# Patient Record
Sex: MALE | Race: ASIAN | NOT HISPANIC OR LATINO | URBAN - METROPOLITAN AREA
[De-identification: names, ages, dates, MRNs, and addresses within clinical notes are randomized per-mention and may not be internally consistent; named-entity substitution may affect disease eponyms.]

---

## 2020-03-13 ENCOUNTER — EMERGENCY (EMERGENCY)
Facility: HOSPITAL | Age: 55
LOS: 1 days | Discharge: HOME | End: 2020-03-13
Attending: EMERGENCY MEDICINE
Payer: COMMERCIAL

## 2020-03-13 VITALS
DIASTOLIC BLOOD PRESSURE: 103 MMHG | HEIGHT: 67 IN | RESPIRATION RATE: 18 BRPM | OXYGEN SATURATION: 99 % | SYSTOLIC BLOOD PRESSURE: 160 MMHG | HEART RATE: 92 BPM | TEMPERATURE: 98 F | WEIGHT: 149.91 LBS

## 2020-03-13 PROCEDURE — 99284 EMERGENCY DEPT VISIT MOD MDM: CPT

## 2020-03-14 VITALS
TEMPERATURE: 98 F | SYSTOLIC BLOOD PRESSURE: 141 MMHG | DIASTOLIC BLOOD PRESSURE: 80 MMHG | OXYGEN SATURATION: 100 % | HEART RATE: 60 BPM | RESPIRATION RATE: 18 BRPM

## 2020-03-14 LAB
APPEARANCE UR: ABNORMAL
BILIRUB UR-MCNC: NEGATIVE — SIGNIFICANT CHANGE UP
COLOR SPEC: SIGNIFICANT CHANGE UP
DIFF PNL FLD: ABNORMAL
GLUCOSE UR QL: NEGATIVE — SIGNIFICANT CHANGE UP
KETONES UR-MCNC: NEGATIVE — SIGNIFICANT CHANGE UP
LEUKOCYTE ESTERASE UR-ACNC: NEGATIVE — SIGNIFICANT CHANGE UP
NITRITE UR-MCNC: NEGATIVE — SIGNIFICANT CHANGE UP
PH UR: 6.5 — SIGNIFICANT CHANGE UP (ref 5–8)
PROT UR-MCNC: ABNORMAL
SP GR SPEC: 1.01 — SIGNIFICANT CHANGE UP (ref 1.01–1.02)
UROBILINOGEN FLD QL: NEGATIVE — SIGNIFICANT CHANGE UP

## 2020-03-14 PROCEDURE — 81001 URINALYSIS AUTO W/SCOPE: CPT

## 2020-03-14 PROCEDURE — 99283 EMERGENCY DEPT VISIT LOW MDM: CPT

## 2020-03-14 PROCEDURE — 87086 URINE CULTURE/COLONY COUNT: CPT

## 2020-03-14 NOTE — ED PROVIDER NOTE - OBJECTIVE STATEMENT
53yo M here w/ cc of hematuria    States prostate biopsy on 3/3 by Dr. Comer urologist, now with reddish urine. No abd pain, no back pain, no f/c. nO n/v/d.

## 2020-03-14 NOTE — ED PROVIDER NOTE - NS ED ROS FT
CONSTITUTIONAL: No fevers, no chills  Cardiovascular: No Chest pain  Respiratory: No SOB  Gastrointestinal: No n/v/d, no abd pain  Genitourinary: refer to HPI  PSYCHIATRIC: no known mental health issues.

## 2020-03-14 NOTE — ED PROVIDER NOTE - PATIENT PORTAL LINK FT
You can access the FollowMyHealth Patient Portal offered by [facility] by registering at the following website: [email or web address]. By joining [email or web address]’s FollowMyHealth portal, you will also be able to view your health information using other applications (apps) compatible with our system.

## 2020-03-14 NOTE — ED PROVIDER NOTE - NSFOLLOWUPINSTRUCTIONS_ED_ALL_ED_FT
(1) Follow up with your Urologist as discussed  (2) Immediately seek care at your nearest emergency room if your worsen, persist, or do not resolve   (3) Take Tylenol (up to 1000mg or 1 g)  and/or Motrin (up to 600mg) up to every 6 hours as needed for pain.

## 2020-03-14 NOTE — ED PROVIDER NOTE - CLINICAL SUMMARY MEDICAL DECISION MAKING FREE TEXT BOX
[place] PGY-2:  55yo M here with hematuria s/p prostate biopsy done for reportedly high PH of urine, mild amount of blood in urine grossly likely normal after this procedure, no signs or sx ob obstruction, will attempt to reach urologist, ua, anticipate D/C

## 2020-03-14 NOTE — ED ADULT NURSE NOTE - OBJECTIVE STATEMENT
Patient 54 year old male, A/O x4. Patient came in due to hematuria/ Patient stated it began at 4pm. Patient stated blood was dark. Patient stated on March 3rd he received a prostate biopsy at [facility] because that was the first time he noticed blood in urine. Patient stated the doctor from the [facility] stated he may have an infection. Patient waited to come to ED today because urine is still red. Patient denies dysuria. Patient also complaining of headache and right neck pain. Denies PMH and PSH. Medications- amlodipine and tamsulosin. Abd. soft, nontender, nondistended. Lungs clear bilaterally. Skin intact, dry, warm. Patient ambulates independently. Denies SOB, chest pain, numbness/tingling, cough, chills, fever, N/V/D, dizziness, light-headedness.

## 2020-03-15 LAB
CULTURE RESULTS: NO GROWTH — SIGNIFICANT CHANGE UP
SPECIMEN SOURCE: SIGNIFICANT CHANGE UP

## 2021-12-25 ENCOUNTER — TRANSCRIPTION ENCOUNTER (OUTPATIENT)
Age: 56
End: 2021-12-25

## 2022-06-30 ENCOUNTER — INPATIENT (INPATIENT)
Facility: HOSPITAL | Age: 57
LOS: 4 days | Discharge: HOME | DRG: 862 | End: 2022-07-05
Attending: INTERNAL MEDICINE | Admitting: STUDENT IN AN ORGANIZED HEALTH CARE EDUCATION/TRAINING PROGRAM
Payer: COMMERCIAL

## 2022-06-30 VITALS
SYSTOLIC BLOOD PRESSURE: 124 MMHG | HEART RATE: 99 BPM | TEMPERATURE: 103 F | WEIGHT: 154.98 LBS | DIASTOLIC BLOOD PRESSURE: 67 MMHG | OXYGEN SATURATION: 97 % | HEIGHT: 67 IN | RESPIRATION RATE: 18 BRPM

## 2022-06-30 LAB
BASE EXCESS BLDV CALC-SCNC: -2.6 MMOL/L — LOW (ref -2–2)
CA-I SERPL-SCNC: 1.19 MMOL/L — SIGNIFICANT CHANGE UP (ref 1.15–1.33)
CHLORIDE BLDV-SCNC: 102 MMOL/L — SIGNIFICANT CHANGE UP (ref 96–108)
CO2 BLDV-SCNC: 23 MMOL/L — SIGNIFICANT CHANGE UP (ref 22–26)
GAS PNL BLDV: 131 MMOL/L — LOW (ref 136–145)
GAS PNL BLDV: SIGNIFICANT CHANGE UP
GAS PNL BLDV: SIGNIFICANT CHANGE UP
GLUCOSE BLDV-MCNC: 167 MG/DL — HIGH (ref 70–99)
HCO3 BLDV-SCNC: 22 MMOL/L — SIGNIFICANT CHANGE UP (ref 22–29)
HCT VFR BLDA CALC: 44 % — SIGNIFICANT CHANGE UP (ref 39–51)
HGB BLD CALC-MCNC: 14.7 G/DL — SIGNIFICANT CHANGE UP (ref 12.6–17.4)
LACTATE BLDV-MCNC: 1.6 MMOL/L — SIGNIFICANT CHANGE UP (ref 0.7–2)
PCO2 BLDV: 36 MMHG — LOW (ref 42–55)
PH BLDV: 7.39 — SIGNIFICANT CHANGE UP (ref 7.32–7.43)
PO2 BLDV: 52 MMHG — HIGH (ref 25–45)
POTASSIUM BLDV-SCNC: 3.6 MMOL/L — SIGNIFICANT CHANGE UP (ref 3.5–5.1)
SAO2 % BLDV: 84.6 % — SIGNIFICANT CHANGE UP (ref 67–88)

## 2022-06-30 PROCEDURE — 99285 EMERGENCY DEPT VISIT HI MDM: CPT

## 2022-06-30 RX ORDER — ACETAMINOPHEN 500 MG
650 TABLET ORAL ONCE
Refills: 0 | Status: COMPLETED | OUTPATIENT
Start: 2022-06-30 | End: 2022-06-30

## 2022-06-30 RX ORDER — SODIUM CHLORIDE 9 MG/ML
1000 INJECTION INTRAMUSCULAR; INTRAVENOUS; SUBCUTANEOUS ONCE
Refills: 0 | Status: COMPLETED | OUTPATIENT
Start: 2022-06-30 | End: 2022-06-30

## 2022-06-30 RX ADMIN — Medication 650 MILLIGRAM(S): at 23:59

## 2022-06-30 NOTE — ED ADULT TRIAGE NOTE - CHIEF COMPLAINT QUOTE
fever since yesterday morning. Saw MD Comer in [place] today, given augmentin for UTI and advised to go to ED if temp >100F continues.

## 2022-06-30 NOTE — ED ADULT NURSE NOTE - OBJECTIVE STATEMENT
57y male A&OX4 coming in through triage complaining of fevers. PMHX HTN. Pt reports he went to primary earlier this week because of burning in urination and found out he has a UTI. Pt reports fevers started early this morning with body aches. Pt called primary and was told if the fever doesn't subside to come in to the ER0. Pt able to ambulate to bath room w/ no difficulty.  Pt denies chest pain, shortness of breath, N/V/D, Abd pain. Labs have been drawn and sent to lab. Pt awaiting dispo.

## 2022-07-01 DIAGNOSIS — N39.0 URINARY TRACT INFECTION, SITE NOT SPECIFIED: ICD-10-CM

## 2022-07-01 DIAGNOSIS — I10 ESSENTIAL (PRIMARY) HYPERTENSION: ICD-10-CM

## 2022-07-01 DIAGNOSIS — N40.0 BENIGN PROSTATIC HYPERPLASIA WITHOUT LOWER URINARY TRACT SYMPTOMS: ICD-10-CM

## 2022-07-01 DIAGNOSIS — A41.9 SEPSIS, UNSPECIFIED ORGANISM: ICD-10-CM

## 2022-07-01 LAB
ALBUMIN SERPL ELPH-MCNC: 3.4 G/DL — SIGNIFICANT CHANGE UP (ref 3.3–5)
ALBUMIN SERPL ELPH-MCNC: 4.4 G/DL — SIGNIFICANT CHANGE UP (ref 3.3–5)
ALP SERPL-CCNC: 69 U/L — SIGNIFICANT CHANGE UP (ref 40–120)
ALP SERPL-CCNC: 83 U/L — SIGNIFICANT CHANGE UP (ref 40–120)
ALT FLD-CCNC: 16 U/L — SIGNIFICANT CHANGE UP (ref 10–45)
ALT FLD-CCNC: 24 U/L — SIGNIFICANT CHANGE UP (ref 10–45)
ANION GAP SERPL CALC-SCNC: 11 MMOL/L — SIGNIFICANT CHANGE UP (ref 5–17)
ANION GAP SERPL CALC-SCNC: 8 MMOL/L — SIGNIFICANT CHANGE UP (ref 5–17)
APPEARANCE UR: CLEAR — SIGNIFICANT CHANGE UP
AST SERPL-CCNC: 24 U/L — SIGNIFICANT CHANGE UP (ref 10–40)
AST SERPL-CCNC: 32 U/L — SIGNIFICANT CHANGE UP (ref 10–40)
BACTERIA # UR AUTO: NEGATIVE — SIGNIFICANT CHANGE UP
BASOPHILS # BLD AUTO: 0.03 K/UL — SIGNIFICANT CHANGE UP (ref 0–0.2)
BASOPHILS # BLD AUTO: 0.04 K/UL — SIGNIFICANT CHANGE UP (ref 0–0.2)
BASOPHILS NFR BLD AUTO: 0.2 % — SIGNIFICANT CHANGE UP (ref 0–2)
BASOPHILS NFR BLD AUTO: 0.3 % — SIGNIFICANT CHANGE UP (ref 0–2)
BILIRUB SERPL-MCNC: 0.6 MG/DL — SIGNIFICANT CHANGE UP (ref 0.2–1.2)
BILIRUB SERPL-MCNC: 0.8 MG/DL — SIGNIFICANT CHANGE UP (ref 0.2–1.2)
BILIRUB UR-MCNC: NEGATIVE — SIGNIFICANT CHANGE UP
BUN SERPL-MCNC: 11 MG/DL — SIGNIFICANT CHANGE UP (ref 7–23)
BUN SERPL-MCNC: 15 MG/DL — SIGNIFICANT CHANGE UP (ref 7–23)
CALCIUM SERPL-MCNC: 7.7 MG/DL — LOW (ref 8.4–10.5)
CALCIUM SERPL-MCNC: 9.1 MG/DL — SIGNIFICANT CHANGE UP (ref 8.4–10.5)
CHLORIDE SERPL-SCNC: 102 MMOL/L — SIGNIFICANT CHANGE UP (ref 96–108)
CHLORIDE SERPL-SCNC: 105 MMOL/L — SIGNIFICANT CHANGE UP (ref 96–108)
CO2 SERPL-SCNC: 21 MMOL/L — LOW (ref 22–31)
CO2 SERPL-SCNC: 21 MMOL/L — LOW (ref 22–31)
COLOR SPEC: YELLOW — SIGNIFICANT CHANGE UP
CREAT SERPL-MCNC: 0.83 MG/DL — SIGNIFICANT CHANGE UP (ref 0.5–1.3)
CREAT SERPL-MCNC: 0.91 MG/DL — SIGNIFICANT CHANGE UP (ref 0.5–1.3)
CULTURE RESULTS: SIGNIFICANT CHANGE UP
DIFF PNL FLD: ABNORMAL
EGFR: 101 ML/MIN/1.73M2 — SIGNIFICANT CHANGE UP
EGFR: 97 ML/MIN/1.73M2 — SIGNIFICANT CHANGE UP
EOSINOPHIL # BLD AUTO: 0.01 K/UL — SIGNIFICANT CHANGE UP (ref 0–0.5)
EOSINOPHIL # BLD AUTO: 0.02 K/UL — SIGNIFICANT CHANGE UP (ref 0–0.5)
EOSINOPHIL NFR BLD AUTO: 0.1 % — SIGNIFICANT CHANGE UP (ref 0–6)
EOSINOPHIL NFR BLD AUTO: 0.1 % — SIGNIFICANT CHANGE UP (ref 0–6)
EPI CELLS # UR: 4 /HPF — SIGNIFICANT CHANGE UP
GLUCOSE SERPL-MCNC: 139 MG/DL — HIGH (ref 70–99)
GLUCOSE SERPL-MCNC: 164 MG/DL — HIGH (ref 70–99)
GLUCOSE UR QL: ABNORMAL
HCT VFR BLD CALC: 37.4 % — LOW (ref 39–50)
HCT VFR BLD CALC: 43.5 % — SIGNIFICANT CHANGE UP (ref 39–50)
HGB BLD-MCNC: 12.3 G/DL — LOW (ref 13–17)
HGB BLD-MCNC: 14.2 G/DL — SIGNIFICANT CHANGE UP (ref 13–17)
HYALINE CASTS # UR AUTO: 5 /LPF — HIGH (ref 0–2)
IMM GRANULOCYTES NFR BLD AUTO: 0.5 % — SIGNIFICANT CHANGE UP (ref 0–1.5)
IMM GRANULOCYTES NFR BLD AUTO: 0.7 % — SIGNIFICANT CHANGE UP (ref 0–1.5)
KETONES UR-MCNC: NEGATIVE — SIGNIFICANT CHANGE UP
LEUKOCYTE ESTERASE UR-ACNC: ABNORMAL
LYMPHOCYTES # BLD AUTO: 0.77 K/UL — LOW (ref 1–3.3)
LYMPHOCYTES # BLD AUTO: 0.84 K/UL — LOW (ref 1–3.3)
LYMPHOCYTES # BLD AUTO: 5.6 % — LOW (ref 13–44)
LYMPHOCYTES # BLD AUTO: 5.7 % — LOW (ref 13–44)
MCHC RBC-ENTMCNC: 30 PG — SIGNIFICANT CHANGE UP (ref 27–34)
MCHC RBC-ENTMCNC: 30 PG — SIGNIFICANT CHANGE UP (ref 27–34)
MCHC RBC-ENTMCNC: 32.6 GM/DL — SIGNIFICANT CHANGE UP (ref 32–36)
MCHC RBC-ENTMCNC: 32.9 GM/DL — SIGNIFICANT CHANGE UP (ref 32–36)
MCV RBC AUTO: 91.2 FL — SIGNIFICANT CHANGE UP (ref 80–100)
MCV RBC AUTO: 91.8 FL — SIGNIFICANT CHANGE UP (ref 80–100)
MONOCYTES # BLD AUTO: 0.96 K/UL — HIGH (ref 0–0.9)
MONOCYTES # BLD AUTO: 1.23 K/UL — HIGH (ref 0–0.9)
MONOCYTES NFR BLD AUTO: 7 % — SIGNIFICANT CHANGE UP (ref 2–14)
MONOCYTES NFR BLD AUTO: 8.3 % — SIGNIFICANT CHANGE UP (ref 2–14)
NEUTROPHILS # BLD AUTO: 11.88 K/UL — HIGH (ref 1.8–7.4)
NEUTROPHILS # BLD AUTO: 12.62 K/UL — HIGH (ref 1.8–7.4)
NEUTROPHILS NFR BLD AUTO: 85.1 % — HIGH (ref 43–77)
NEUTROPHILS NFR BLD AUTO: 86.4 % — HIGH (ref 43–77)
NITRITE UR-MCNC: NEGATIVE — SIGNIFICANT CHANGE UP
NRBC # BLD: 0 /100 WBCS — SIGNIFICANT CHANGE UP (ref 0–0)
NRBC # BLD: 0 /100 WBCS — SIGNIFICANT CHANGE UP (ref 0–0)
PH UR: 6 — SIGNIFICANT CHANGE UP (ref 5–8)
PLATELET # BLD AUTO: 161 K/UL — SIGNIFICANT CHANGE UP (ref 150–400)
PLATELET # BLD AUTO: 209 K/UL — SIGNIFICANT CHANGE UP (ref 150–400)
POTASSIUM SERPL-MCNC: 3.4 MMOL/L — LOW (ref 3.5–5.3)
POTASSIUM SERPL-MCNC: 3.6 MMOL/L — SIGNIFICANT CHANGE UP (ref 3.5–5.3)
POTASSIUM SERPL-SCNC: 3.4 MMOL/L — LOW (ref 3.5–5.3)
POTASSIUM SERPL-SCNC: 3.6 MMOL/L — SIGNIFICANT CHANGE UP (ref 3.5–5.3)
PROT SERPL-MCNC: 6 G/DL — SIGNIFICANT CHANGE UP (ref 6–8.3)
PROT SERPL-MCNC: 7.3 G/DL — SIGNIFICANT CHANGE UP (ref 6–8.3)
PROT UR-MCNC: ABNORMAL
RBC # BLD: 4.1 M/UL — LOW (ref 4.2–5.8)
RBC # BLD: 4.74 M/UL — SIGNIFICANT CHANGE UP (ref 4.2–5.8)
RBC # FLD: 13 % — SIGNIFICANT CHANGE UP (ref 10.3–14.5)
RBC # FLD: 13.1 % — SIGNIFICANT CHANGE UP (ref 10.3–14.5)
RBC CASTS # UR COMP ASSIST: 3 /HPF — SIGNIFICANT CHANGE UP (ref 0–4)
SARS-COV-2 RNA SPEC QL NAA+PROBE: SIGNIFICANT CHANGE UP
SODIUM SERPL-SCNC: 134 MMOL/L — LOW (ref 135–145)
SODIUM SERPL-SCNC: 134 MMOL/L — LOW (ref 135–145)
SP GR SPEC: 1.03 — HIGH (ref 1.01–1.02)
SPECIMEN SOURCE: SIGNIFICANT CHANGE UP
UROBILINOGEN FLD QL: ABNORMAL
WBC # BLD: 13.75 K/UL — HIGH (ref 3.8–10.5)
WBC # BLD: 14.83 K/UL — HIGH (ref 3.8–10.5)
WBC # FLD AUTO: 13.75 K/UL — HIGH (ref 3.8–10.5)
WBC # FLD AUTO: 14.83 K/UL — HIGH (ref 3.8–10.5)
WBC UR QL: 27 /HPF — HIGH (ref 0–5)

## 2022-07-01 PROCEDURE — 71045 X-RAY EXAM CHEST 1 VIEW: CPT | Mod: 26

## 2022-07-01 PROCEDURE — 99223 1ST HOSP IP/OBS HIGH 75: CPT

## 2022-07-01 PROCEDURE — 99222 1ST HOSP IP/OBS MODERATE 55: CPT

## 2022-07-01 PROCEDURE — 74177 CT ABD & PELVIS W/CONTRAST: CPT | Mod: 26

## 2022-07-01 PROCEDURE — 12345: CPT | Mod: NC

## 2022-07-01 RX ORDER — POTASSIUM CHLORIDE 20 MEQ
40 PACKET (EA) ORAL ONCE
Refills: 0 | Status: COMPLETED | OUTPATIENT
Start: 2022-07-01 | End: 2022-07-01

## 2022-07-01 RX ORDER — AMLODIPINE BESYLATE 2.5 MG/1
1 TABLET ORAL
Qty: 0 | Refills: 0 | DISCHARGE

## 2022-07-01 RX ORDER — VANCOMYCIN HCL 1 G
1500 VIAL (EA) INTRAVENOUS ONCE
Refills: 0 | Status: COMPLETED | OUTPATIENT
Start: 2022-07-01 | End: 2022-07-01

## 2022-07-01 RX ORDER — SODIUM CHLORIDE 9 MG/ML
1000 INJECTION INTRAMUSCULAR; INTRAVENOUS; SUBCUTANEOUS
Refills: 0 | Status: DISCONTINUED | OUTPATIENT
Start: 2022-07-01 | End: 2022-07-01

## 2022-07-01 RX ORDER — VANCOMYCIN HCL 1 G
1000 VIAL (EA) INTRAVENOUS EVERY 12 HOURS
Refills: 0 | Status: DISCONTINUED | OUTPATIENT
Start: 2022-07-01 | End: 2022-07-01

## 2022-07-01 RX ORDER — SODIUM CHLORIDE 9 MG/ML
2000 INJECTION INTRAMUSCULAR; INTRAVENOUS; SUBCUTANEOUS ONCE
Refills: 0 | Status: DISCONTINUED | OUTPATIENT
Start: 2022-07-01 | End: 2022-07-01

## 2022-07-01 RX ORDER — SODIUM CHLORIDE 9 MG/ML
1000 INJECTION INTRAMUSCULAR; INTRAVENOUS; SUBCUTANEOUS ONCE
Refills: 0 | Status: COMPLETED | OUTPATIENT
Start: 2022-07-01 | End: 2022-07-01

## 2022-07-01 RX ORDER — CEFTRIAXONE 500 MG/1
1000 INJECTION, POWDER, FOR SOLUTION INTRAMUSCULAR; INTRAVENOUS ONCE
Refills: 0 | Status: COMPLETED | OUTPATIENT
Start: 2022-07-01 | End: 2022-07-01

## 2022-07-01 RX ORDER — CEFTRIAXONE 500 MG/1
2000 INJECTION, POWDER, FOR SOLUTION INTRAMUSCULAR; INTRAVENOUS EVERY 24 HOURS
Refills: 0 | Status: DISCONTINUED | OUTPATIENT
Start: 2022-07-01 | End: 2022-07-01

## 2022-07-01 RX ORDER — SODIUM CHLORIDE 9 MG/ML
1000 INJECTION INTRAMUSCULAR; INTRAVENOUS; SUBCUTANEOUS
Refills: 0 | Status: COMPLETED | OUTPATIENT
Start: 2022-07-01 | End: 2022-07-02

## 2022-07-01 RX ORDER — TAMSULOSIN HYDROCHLORIDE 0.4 MG/1
0.4 CAPSULE ORAL AT BEDTIME
Refills: 0 | Status: DISCONTINUED | OUTPATIENT
Start: 2022-07-01 | End: 2022-07-01

## 2022-07-01 RX ORDER — ACETAMINOPHEN 500 MG
650 TABLET ORAL EVERY 6 HOURS
Refills: 0 | Status: DISCONTINUED | OUTPATIENT
Start: 2022-07-01 | End: 2022-07-05

## 2022-07-01 RX ORDER — MEROPENEM 1 G/30ML
1000 INJECTION INTRAVENOUS EVERY 8 HOURS
Refills: 0 | Status: DISCONTINUED | OUTPATIENT
Start: 2022-07-01 | End: 2022-07-05

## 2022-07-01 RX ORDER — TAMSULOSIN HYDROCHLORIDE 0.4 MG/1
0.8 CAPSULE ORAL AT BEDTIME
Refills: 0 | Status: DISCONTINUED | OUTPATIENT
Start: 2022-07-01 | End: 2022-07-05

## 2022-07-01 RX ORDER — ENOXAPARIN SODIUM 100 MG/ML
40 INJECTION SUBCUTANEOUS EVERY 24 HOURS
Refills: 0 | Status: DISCONTINUED | OUTPATIENT
Start: 2022-07-01 | End: 2022-07-05

## 2022-07-01 RX ORDER — AMLODIPINE BESYLATE 2.5 MG/1
5 TABLET ORAL DAILY
Refills: 0 | Status: DISCONTINUED | OUTPATIENT
Start: 2022-07-01 | End: 2022-07-05

## 2022-07-01 RX ADMIN — ENOXAPARIN SODIUM 40 MILLIGRAM(S): 100 INJECTION SUBCUTANEOUS at 13:24

## 2022-07-01 RX ADMIN — SODIUM CHLORIDE 100 MILLILITER(S): 9 INJECTION INTRAMUSCULAR; INTRAVENOUS; SUBCUTANEOUS at 21:30

## 2022-07-01 RX ADMIN — Medication 40 MILLIEQUIVALENT(S): at 13:24

## 2022-07-01 RX ADMIN — SODIUM CHLORIDE 1000 MILLILITER(S): 9 INJECTION INTRAMUSCULAR; INTRAVENOUS; SUBCUTANEOUS at 00:00

## 2022-07-01 RX ADMIN — MEROPENEM 100 MILLIGRAM(S): 1 INJECTION INTRAVENOUS at 05:07

## 2022-07-01 RX ADMIN — Medication 650 MILLIGRAM(S): at 18:50

## 2022-07-01 RX ADMIN — Medication 650 MILLIGRAM(S): at 05:47

## 2022-07-01 RX ADMIN — SODIUM CHLORIDE 100 MILLILITER(S): 9 INJECTION INTRAMUSCULAR; INTRAVENOUS; SUBCUTANEOUS at 18:25

## 2022-07-01 RX ADMIN — SODIUM CHLORIDE 100 MILLILITER(S): 9 INJECTION INTRAMUSCULAR; INTRAVENOUS; SUBCUTANEOUS at 05:09

## 2022-07-01 RX ADMIN — Medication 650 MILLIGRAM(S): at 18:25

## 2022-07-01 RX ADMIN — CEFTRIAXONE 100 MILLIGRAM(S): 500 INJECTION, POWDER, FOR SOLUTION INTRAMUSCULAR; INTRAVENOUS at 01:16

## 2022-07-01 RX ADMIN — Medication 300 MILLIGRAM(S): at 05:48

## 2022-07-01 RX ADMIN — Medication 650 MILLIGRAM(S): at 00:42

## 2022-07-01 RX ADMIN — AMLODIPINE BESYLATE 5 MILLIGRAM(S): 2.5 TABLET ORAL at 05:07

## 2022-07-01 RX ADMIN — MEROPENEM 100 MILLIGRAM(S): 1 INJECTION INTRAVENOUS at 21:28

## 2022-07-01 RX ADMIN — TAMSULOSIN HYDROCHLORIDE 0.8 MILLIGRAM(S): 0.4 CAPSULE ORAL at 21:33

## 2022-07-01 RX ADMIN — MEROPENEM 100 MILLIGRAM(S): 1 INJECTION INTRAVENOUS at 13:25

## 2022-07-01 RX ADMIN — SODIUM CHLORIDE 1000 MILLILITER(S): 9 INJECTION INTRAMUSCULAR; INTRAVENOUS; SUBCUTANEOUS at 01:44

## 2022-07-01 NOTE — ED ADULT NURSE REASSESSMENT NOTE - NS ED NURSE REASSESS COMMENT FT1
Rosario catheter was inserted w/second LALITA F[place]on. Sterile technique maintained. 800ml of yellow clear urine was drain. Pt tolerated well. Pt awaiting bed assignment.

## 2022-07-01 NOTE — CONSULT NOTE ADULT - ATTENDING COMMENTS
Pt seen and examined with team  Agree with above eval and mgmt plan  CT, labs reviewed  IV antibiotics  follow up cultures  Rosario catheter for now

## 2022-07-01 NOTE — ED PROVIDER NOTE - ATTENDING CONTRIBUTION TO CARE
Attending Statement (KOKO Reddy MD):    HPI: 58 y/o M with h/o HTN, presenting with fever for 1 day; in setting of cystoscopy performed 2 days ago. Reports the day following the procedure, began having painful urination, urinary urgency and urinary frequency. Was given im ceftriaxone and precscribed augmentin for a positive UA yesterday.  Today, began having fever, fatigue/lethargy with Tmax of 103, prompting him to come to ED. No abd pain, no n/v, no bleeding/discharge from penis. No cough/congestion. No CP/SOB, no flank pain.    Review of Systems:  -General: +fever +chills  -ENT: no congestion, no difficulty swallowing  -Pulmonary: no cough, no shortness of breath  -Cardiac: no chest pain, no palpitations  -Gastrointestinal: no abdominal pain, no nausea, no vomiting, and no diarrhea.  -Genitourinary: see hpi  -Musculoskeletal: no back or neck pain  -Skin: no rashes  -Endocrine: No h/o diabetes or thyroid disease  -Neurologic: No new weakness or numbness in extremities    All else negative unless otherwise specified elsewhere in this note.    PSH/PMH as noted above    On Physical Exam:  General: well appearing, in NAD, speaking clearly in full sentences and without difficulty; cooperative with exam  HEENT: anicteric sclera, airway patent  Neck: no JVD  Cardiac: tachy s1s2 no mgr  Lungs: CTABL  Abdomen: soft nontender/nondistended; no cva tenderness b/l  : no bladder tenderness or distension  Skin: intact, no rash  Extremities: no peripheral edema, no gross deformities    MDM: sepsis work up (febrile, tachycardic); iv fluids, empiric antibiotics; suspect ascending UTI given recent procedure; on resident's exam prostate not boggy or tender (unlikely prostatitis); will plan for admission after initial labs and cxr, send rvp.

## 2022-07-01 NOTE — H&P ADULT - NSHPREVIEWOFSYSTEMS_GEN_ALL_CORE
CONSTITUTIONAL: +fever. no weakness  ENMT:  No sinus or throat pain  RESPIRATORY: No cough, wheezing, chills or hemoptysis; No shortness of breath  CARDIOVASCULAR: No chest pain, palpitations, dizziness, or leg swelling  GASTROINTESTINAL: No abdominal or epigastric pain. No nausea, vomiting, or hematemesis; No diarrhea or constipation. No melena or hematochezia.  GENITOURINARY: +suprapubic pain, +flank pain, +weak stream, +incomplete bladder emptying  NEUROLOGICAL: No headaches, memory loss, loss of strength, numbness, or tremors  SKIN: No rashes,  No hives or eczema  ENDOCRINE: No heat or cold intolerance; No hair loss  MUSCULOSKELETAL: No joint pain or swelling; No muscle, back, or extremity pain  PSYCHIATRIC: No depression, anxiety, mood swings, or difficulty sleeping  HEME/LYMPH: No easy bruising, or bleeding gums; no enlarged LN

## 2022-07-01 NOTE — PATIENT PROFILE ADULT - FALL HARM RISK - UNIVERSAL INTERVENTIONS
Call bell, personal items and telephone in reach/Instruct patient to call for assistance before getting out of bed or chair/Non-slip footwear when patient is out of bed/[place] to call system/Physically safe environment - no spills, clutter or unnecessary equipment/Purposeful Proactive Rounding/Room/bathroom lighting operational, light cord in reach

## 2022-07-01 NOTE — H&P ADULT - PROBLEM SELECTOR PLAN 2
per records, psa ~5 in 2015 + ~14 in 2020 with negative prostate biopsy at both times; most recent psa ~8 in 2022 with MRI prostate 2022 showing PIRADS 2  obtain CT abdo/pelv  maintain kaur catheter for now  continue home tamsulosin; consider increasing dose to 0.8 HS and adding finasteride 5  request urology consult in AM

## 2022-07-01 NOTE — ED PROVIDER NOTE - OBJECTIVE STATEMENT
58 y/o m hx of htn p/w fever x 1 day. pt states he saw outpatient urology for a cystoscopy 2 days ago, since having dysuria/frequency. saw dr ochoa outpatient who gave patient IM ceftriaxone, PO augmentin due to positive UA. patient had fever tmax 103, and came to ed for worsening lethargy fatigue  otherwise pt denies any cp, palpitations, sob, abdominal pain, v/d, numbness, rash, recent travel, ill contacts

## 2022-07-01 NOTE — H&P ADULT - HISTORY OF PRESENT ILLNESS
58 yo M with pmh htn, bph p/w fever, chills, suprapubic and flank pain that started soon after recent urinary tract instrumentation with cystoscopy with urethral dilation procedure ~2 days ago. Patient saw his PCP for the aforementioned; UA (was suggestive of UTI) and UC (no results available) collected, patient received IM/PO abx (augmentin). Despite outpatient mgmt, symptoms worsened, so PCP directed patient to ER.    of note, patient has been having BPH with LUTS for many months now; closely following urologist Dr. Comer; per paper records, psa ~5 in 2015 + ~14 in 2020 with negative prostate biopsy at both times; most recent psa ~8 in 2022 with MRI prostate 2022 showing PIRADS 2.    In ER, patient documented to be febrile; found to have leukocytosis; UA showing pyuria with leukocyte esterase, no nitrates or bacteria; sepsis criteria met with suspected genitourinary tract source, cultures collected, and EGDT initiated with 2 L NS bolus + 1 g Ceftriaxone; patient was also noted to be retaining urine in ER ~6-700 ml, so kaur inserted

## 2022-07-01 NOTE — CHART NOTE - NSCHARTNOTEFT_GEN_A_CORE
patient seen and examined. feels a lot better. fevers resolved. kaur placed for urinary retention. VS reviewed. Labs reviewed. agree with H&P done earlier this morning    58 yo M with pmh htn, bph p/w fever, chills, suprapubic and flank pain p/w sepsis 2/2 to acute b/l pyelonephritis      Problem/Plan - 1: sepsis 2/2 to UTI/ acute b/l pyelonephritis   ·  Plan: in the setting of obstructive uropathy, recent urinary tract instrumentation, failed outpatient therapy  UA showing pyuria, but no nitrites, no bacteria  met sepsis criteria with fever + leukocytosis; lactate wnl  follow up UC and BC  CT abd pelvis with acute b/l pyelonephritis   Monitor for fever, changes in white count, mental status, lactate    c/w IV Meropenem      Problem/Plan - 2:  ·  Problem: BPH (benign prostatic hyperplasia) with urinary retention   ·  Plan: per records, psa ~5 in 2015 + ~14 in 2020 with negative prostate biopsy at both times; most recent psa ~8 in 2022 with MRI prostate 2022 showing PIRADS 2  had cystoscopy 2 weeks ago and follows with Dr Santana Comer M.D.   maintain kaur catheter for now  continue home tamsulosin; consider increasing dose to 0.8 HS and adding finasteride 5  urology consult     Problem/Plan - 3:  ·  Problem: HTN (hypertension).   ·  Plan: goal BP <150/90  continue home amlodipine.       Additional Information:  Additional Information: full code  activity as tolerated   vte ppx with lovenox  regular diet

## 2022-07-01 NOTE — H&P ADULT - PROBLEM SELECTOR PLAN 1
in the setting of obstructive uropathy, recent urinary tract instrumentation, failed outpatient therapy  UA showing pyuria, but no nitrites, no bacteria  met sepsis criteria with fever + leukocytosis; lactate wnl  follow up UC and BC  obtain CT abdo/pelv  Monitor for fever, changes in white count, mental status, lactate    s/p 2 L NS + 1 g ceftriaxone in ER; continue IVF, expand abx coverage (vanc + meropenem) to include MRSA, pseudomonas, ESBL; deescalate according to final c+s

## 2022-07-01 NOTE — CONSULT NOTE ADULT - SUBJECTIVE AND OBJECTIVE BOX
UROLOGY CONSULT NOTE    HPI:  57 year old male with HTN, BPH, was admitted for fevers after cystoscopy  for LUTS  Procedure was a cystoscopy and urodynamics.    Pre-procedure urine culture was negative, pt was given Bactrim DS for prophylaxis.  He had a fever to 103F at home +frequent voids, back pain, and came to the ER  T-102.7F in the ER.  +difficulty voiding in the ER, and a kaur was placed  Has history of elevated PSA last 8.4 2022; MRI 3/2022 shows PIRADS 2; hx of negative prostate biopsies 2020      PAST MEDICAL HISTORY    BPH (benign prostatic hyperplasia)    HTN (hypertension)        PAST SURGICAL HISTORY    No significant past surgical history        FAMILY HISTORY    No pertinent family history in first degree relatives        HOME MEDICATIONS    amLODIPine 5 mg oral tablet: 1 tab(s) orally once a day (2022 04:00)  tamsulosin 0.4 mg oral capsule: 1 cap(s) orally once a day (2022 04:00)      DRUG ALLERGIES    NKDA    REVIEW OF SYSTEMS: Pertinent positives and negatives as stated in HPI, otherwise negative      VITAL SIGNS    T(F): 99.9, Max: 100 (22 @ 04:15)  HR: 82  BP: 128/70  RR: 18  SpO2: 97%          PHYSICAL EXAM    Gen: Well groomed, well dressed, well nourished  Abd: Soft, ND, +suprapubic tenderness  Ext: No edema present b/l      LABS:                        12.3   13.75 )-----------( 161               37.4     134  |  105  |  11  ----------------------------<  139  3.4   |  21  |  0.83    Ca    7.7    TPro  6.0  /  Alb  3.4  /  TBili  0.6  /  DBili  x   /  AST  24  /  ALT  16  /  AlkPhos  69          Urinalysis Basic:    Color: Yellow / Appearance: Clear / S.034 / pH: x  Gluc: x / Ketone: Negative  / Bili: Negative / Urobili: 2 mg/dL   Blood: x / Protein: 30 mg/dL / Nitrite: Negative   Leuk Esterase: Moderate / RBC: 3 /hpf / WBC 27 /HPF   Sq Epi: x / Non Sq Epi: 4 /hpf / Bacteria: Negative      Urine culture: pending    Blood culture: pending      IMAGING:    CT: Acute bilateral pyelonephritis and cystitis.   UROLOGY CONSULT NOTE    HPI:  57 year old male with HTN, BPH admitted for fevers and flank pain after cystoscopy and urodynamics performed on Tuesday. Patient received Bactrim DS for prophylaxis. He had a fever to 103F at home as well as frequency and back pain and was sent to the ED by his urologist. In the ED, he had a temperature of 102.7 and was found to be retaining urine with bladder volume of 600-700 mL. Rosario catheter was placed.    Patient given dose of ceftriaxone and vancomycin in ED, now on meropenem. UA shows pyuria (WBC 27), moderate leukocyte esterase, negative nitrites, negative bacteria. CT shows pyelonephritis and cystitis.    PAST MEDICAL HISTORY    BPH (benign prostatic hyperplasia)    HTN (hypertension)        PAST SURGICAL HISTORY    No significant past surgical history        FAMILY HISTORY    No pertinent family history in first degree relatives        HOME MEDICATIONS    amLODIPine 5 mg oral tablet: 1 tab(s) orally once a day (2022 04:00)  tamsulosin 0.4 mg oral capsule: 1 cap(s) orally once a day (2022 04:00)      DRUG ALLERGIES    NKDA    REVIEW OF SYSTEMS: Pertinent positives and negatives as stated in HPI, otherwise negative      VITAL SIGNS    T(F): 99.9, Max: 100 (22 @ 04:15)  HR: 82  BP: 128/70  RR: 18  SpO2: 97%          PHYSICAL EXAM    Gen: Well groomed, well dressed, well nourished  Abd: Soft, ND, +suprapubic tenderness  Ext: No edema present b/l      LABS:                        12.3   13.75 )-----------( 161               37.4     134  |  105  |  11  ----------------------------<  139  3.4   |  21  |  0.83    Ca    7.7    TPro  6.0  /  Alb  3.4  /  TBili  0.6  /  DBili  x   /  AST  24  /  ALT  16  /  AlkPhos  69          Urinalysis Basic:    Color: Yellow / Appearance: Clear / S.034 / pH: x  Gluc: x / Ketone: Negative  / Bili: Negative / Urobili: 2 mg/dL   Blood: x / Protein: 30 mg/dL / Nitrite: Negative   Leuk Esterase: Moderate / RBC: 3 /hpf / WBC 27 /HPF   Sq Epi: x / Non Sq Epi: 4 /hpf / Bacteria: Negative      Urine culture: Pending    Blood culture: Pending      IMAGING:    CT ABDOMEN AND PELVIS IC    PROCEDURE DATE: 2022        INTERPRETATION: CLINICAL INFORMATION: Sepsis    COMPARISON: None.    CONTRAST/COMPLICATIONS:  IV Contrast: Omnipaque 350 90 cc administered 0 cc discarded  Oral Contrast: NONE  Complications: None reported at time of study completion    PROCEDURE:  CT of the Abdomen and Pelvis was performed.  Sagittal and coronal reformats were performed.    FINDINGS:  LOWER CHEST: Clear lung bases.    LIVER: Within normal limits.  BILE DUCTS: Normal caliber.  GALLBLADDER: Within normal limits.  SPLEEN: Within normal limits.  PANCREAS: Within normal limits.  ADRENALS: Within normal limits.  KIDNEYS/URETERS: Bilateral heterogeneous renal enhancement. No hydronephrosis.    BLADDER: Rosario catheter. Partially contracted. Wall thickening and inflammatory changes. Small amount of intravesical gas, likely related to recent cystoscopy.  REPRODUCTIVE ORGANS: Prostate is enlarged.    BOWEL: No bowel obstruction. Appendix is within normal limits.  PERITONEUM: No ascites.  VESSELS: Atherosclerotic changes.  RETROPERITONEUM/LYMPH NODES: No lymphadenopathy.  ABDOMINAL WALL: Within normal limits.  BONES: Degenerative changes.    IMPRESSION:  Acute bilateral pyelonephritis and cystitis.

## 2022-07-01 NOTE — ED PROVIDER NOTE - NS ED ROS FT
Constitutional: +fever, chills.  Eyes:  No visual changes  ENMT:  No neck pain  Cardiac:  No chest pain  Respiratory:  No cough, SOB  GI:  No nausea, vomiting, diarrhea, abdominal pain.  :  +dysuria  MS:  + back pain.  Neuro:  No headache or lightheadedness  Skin:  No skin rash  Except as documented in the HPI,  all other systems are negative.

## 2022-07-01 NOTE — ED ADULT NURSE REASSESSMENT NOTE - NS ED NURSE REASSESS COMMENT FT1
Pt ambulated to restroom and was unable to urinate. Bladder scanner shows pt has 650ml's of urine in bladder. Pt attempted a second time to urinate and was not successful. MD aware. Pt pending Bed assignment.

## 2022-07-01 NOTE — H&P ADULT - NSHPPHYSICALEXAM_GEN_ALL_CORE
T(C): 37.8 (07-01-22 @ 04:15), Max: 39.3 (06-30-22 @ 20:38)  HR: 99 (07-01-22 @ 04:15) (82 - 99)  BP: 130/74 (07-01-22 @ 04:15) (113/60 - 134/69)  RR: 18 (07-01-22 @ 04:15) (18 - 18)  SpO2: 99% (07-01-22 @ 04:15) (97% - 99%)  GENERAL: NAD, lying in bed comfortably  EYES: EOMI, PERRLA; conjunctiva and sclera clear  ENMT: Moist oral mucosa, no pharyngeal injection or exudates; normal dentition  NECK: Supple, no palpable masses; no JVD  RESPIRATORY: Normal respiratory effort; lungs are clear to auscultation bilaterally  CARDIOVASCULAR: Regular rate and rhythm, normal S1 and S2, no murmur/rub/gallop; No lower extremity edema; Peripheral pulses are 2+ bilaterally  ABDOMEN: suprapubic tenderness, CVAT, kaur placed by ER  MUSCULOSKELETAL:  Normal gait; no clubbing or cyanosis of digits; no joint swelling or tenderness to palpation  PSYCH: A+O to person, place, and time; affect appropriate  NEUROLOGY: CN 2-12 are intact and symmetric; no gross motor or sensory deficits   SKIN: No rashes; no palpable lesions

## 2022-07-01 NOTE — ED PROVIDER NOTE - CLINICAL SUMMARY MEDICAL DECISION MAKING FREE TEXT BOX
56 y/o m hx of htn p/w fever x 1 day. febrile in ed, recent instrumentation, positive ua outpatient, concern for complicated UTI. pending labs and bx. will dose with abx and fluids. will admit given complicated urosepsis

## 2022-07-01 NOTE — ED PROVIDER NOTE - PHYSICAL EXAMINATION
Vital signs reviewed  GENERAL: Patient nontoxic appearing, NAD  HEAD: NCAT  EYES: Anicteric  ENT: MMM  NECK: Supple, non tender  RESPIRATORY: Normal respiratory effort. CTA B/L. No wheezing, rales, rhonchi  CARDIOVASCULAR: Regular rate and rhythm  ABDOMEN: Soft. Nondistended. Nontender. No guarding or rebound. No CVA tenderness.  MUSCULOSKELETAL/EXTREMITIES: Brisk cap refill. 2+ radial pulses. No leg edema.  SKIN:  Warm and dry  NEURO: AAOx3. No gross FND.  PSYCHIATRIC: Cooperative. Affect appropriate. Vital signs reviewed  GENERAL: Patient nontoxic appearing, NAD  HEAD: NCAT  EYES: Anicteric  ENT: MMM  NECK: Supple, non tender  RESPIRATORY: Normal respiratory effort. CTA B/L. No wheezing, rales, rhonchi  CARDIOVASCULAR: Regular rate and rhythm  ABDOMEN: Soft. Nondistended. Nontender. No guarding or rebound. No CVA tenderness.  : no prostate ttp, ed weston CORBIN chaperone   MUSCULOSKELETAL/EXTREMITIES: Brisk cap refill. 2+ radial pulses. No leg edema.  SKIN:  Warm and dry  NEURO: AAOx3. No gross FND.  PSYCHIATRIC: Cooperative. Affect appropriate.

## 2022-07-01 NOTE — CONSULT NOTE ADULT - ASSESSMENT
57 year old male with fevers and urinary retention s/p cystoscopy and urodynamics    -follow up urine and blood cultures  -broad spectrum antibiotics  - 57 year old male with fevers and urinary retention s/p cystoscopy and urodynamics earlier this week. CT with evidence of pyelonephritis and cystitis. Rosario placed and blood and urine cultures pending. Patient is being treated with meropenem.    Plan  - F/u urine and blood cultures  - Continue antibiotics  - Narrow antibiotics when sensitivities result  - Continue Rosario catheter for retention  - Patient should follow up after discharge with Dr. Comer (urologist) for Rosario management    Case discussed with Dr. Lacey.

## 2022-07-01 NOTE — H&P ADULT - ASSESSMENT
56 yo M with pmh htn, bph p/w fever, chills, suprapubic and flank pain likely due to complicated UTI, admitted to medicine for further mgmt

## 2022-07-02 LAB
ANION GAP SERPL CALC-SCNC: 11 MMOL/L — SIGNIFICANT CHANGE UP (ref 5–17)
BUN SERPL-MCNC: 8 MG/DL — SIGNIFICANT CHANGE UP (ref 7–23)
CALCIUM SERPL-MCNC: 8.7 MG/DL — SIGNIFICANT CHANGE UP (ref 8.4–10.5)
CHLORIDE SERPL-SCNC: 105 MMOL/L — SIGNIFICANT CHANGE UP (ref 96–108)
CO2 SERPL-SCNC: 20 MMOL/L — LOW (ref 22–31)
CREAT SERPL-MCNC: 0.64 MG/DL — SIGNIFICANT CHANGE UP (ref 0.5–1.3)
EGFR: 109 ML/MIN/1.73M2 — SIGNIFICANT CHANGE UP
GLUCOSE SERPL-MCNC: 195 MG/DL — HIGH (ref 70–99)
HCT VFR BLD CALC: 43.3 % — SIGNIFICANT CHANGE UP (ref 39–50)
HCV AB S/CO SERPL IA: 0.08 S/CO — SIGNIFICANT CHANGE UP (ref 0–0.99)
HCV AB SERPL-IMP: SIGNIFICANT CHANGE UP
HGB BLD-MCNC: 14.2 G/DL — SIGNIFICANT CHANGE UP (ref 13–17)
MCHC RBC-ENTMCNC: 29.6 PG — SIGNIFICANT CHANGE UP (ref 27–34)
MCHC RBC-ENTMCNC: 32.8 GM/DL — SIGNIFICANT CHANGE UP (ref 32–36)
MCV RBC AUTO: 90.4 FL — SIGNIFICANT CHANGE UP (ref 80–100)
NRBC # BLD: 0 /100 WBCS — SIGNIFICANT CHANGE UP (ref 0–0)
PLATELET # BLD AUTO: 170 K/UL — SIGNIFICANT CHANGE UP (ref 150–400)
POTASSIUM SERPL-MCNC: 3.9 MMOL/L — SIGNIFICANT CHANGE UP (ref 3.5–5.3)
POTASSIUM SERPL-SCNC: 3.9 MMOL/L — SIGNIFICANT CHANGE UP (ref 3.5–5.3)
RBC # BLD: 4.79 M/UL — SIGNIFICANT CHANGE UP (ref 4.2–5.8)
RBC # FLD: 12.7 % — SIGNIFICANT CHANGE UP (ref 10.3–14.5)
SODIUM SERPL-SCNC: 136 MMOL/L — SIGNIFICANT CHANGE UP (ref 135–145)
WBC # BLD: 9.81 K/UL — SIGNIFICANT CHANGE UP (ref 3.8–10.5)
WBC # FLD AUTO: 9.81 K/UL — SIGNIFICANT CHANGE UP (ref 3.8–10.5)

## 2022-07-02 PROCEDURE — 99233 SBSQ HOSP IP/OBS HIGH 50: CPT

## 2022-07-02 PROCEDURE — 99232 SBSQ HOSP IP/OBS MODERATE 35: CPT

## 2022-07-02 RX ADMIN — TAMSULOSIN HYDROCHLORIDE 0.8 MILLIGRAM(S): 0.4 CAPSULE ORAL at 21:31

## 2022-07-02 RX ADMIN — ENOXAPARIN SODIUM 40 MILLIGRAM(S): 100 INJECTION SUBCUTANEOUS at 12:43

## 2022-07-02 RX ADMIN — SODIUM CHLORIDE 100 MILLILITER(S): 9 INJECTION INTRAMUSCULAR; INTRAVENOUS; SUBCUTANEOUS at 17:20

## 2022-07-02 RX ADMIN — MEROPENEM 100 MILLIGRAM(S): 1 INJECTION INTRAVENOUS at 13:38

## 2022-07-02 RX ADMIN — AMLODIPINE BESYLATE 5 MILLIGRAM(S): 2.5 TABLET ORAL at 05:11

## 2022-07-02 RX ADMIN — MEROPENEM 100 MILLIGRAM(S): 1 INJECTION INTRAVENOUS at 05:09

## 2022-07-02 RX ADMIN — MEROPENEM 100 MILLIGRAM(S): 1 INJECTION INTRAVENOUS at 21:31

## 2022-07-02 NOTE — PROGRESS NOTE ADULT - ATTENDING COMMENTS
Pt seen and examined with team  Agree with above eval and mgmt plan  Doing better  blood cultures negative to date  urine culture normal  benji  Continue antibiotics  Outpatient follow up with his Urologist

## 2022-07-03 DIAGNOSIS — R05.9 COUGH, UNSPECIFIED: ICD-10-CM

## 2022-07-03 LAB
HPIV4 RNA SPEC QL NAA+PROBE: DETECTED
RAPID RVP RESULT: DETECTED
SARS-COV-2 RNA SPEC QL NAA+PROBE: SIGNIFICANT CHANGE UP

## 2022-07-03 PROCEDURE — 99233 SBSQ HOSP IP/OBS HIGH 50: CPT

## 2022-07-03 RX ADMIN — ENOXAPARIN SODIUM 40 MILLIGRAM(S): 100 INJECTION SUBCUTANEOUS at 13:54

## 2022-07-03 RX ADMIN — MEROPENEM 100 MILLIGRAM(S): 1 INJECTION INTRAVENOUS at 05:12

## 2022-07-03 RX ADMIN — AMLODIPINE BESYLATE 5 MILLIGRAM(S): 2.5 TABLET ORAL at 05:14

## 2022-07-03 RX ADMIN — Medication 650 MILLIGRAM(S): at 14:47

## 2022-07-03 RX ADMIN — MEROPENEM 100 MILLIGRAM(S): 1 INJECTION INTRAVENOUS at 13:54

## 2022-07-03 RX ADMIN — Medication 650 MILLIGRAM(S): at 15:30

## 2022-07-03 RX ADMIN — TAMSULOSIN HYDROCHLORIDE 0.8 MILLIGRAM(S): 0.4 CAPSULE ORAL at 22:03

## 2022-07-03 RX ADMIN — MEROPENEM 100 MILLIGRAM(S): 1 INJECTION INTRAVENOUS at 22:03

## 2022-07-04 PROCEDURE — 99233 SBSQ HOSP IP/OBS HIGH 50: CPT

## 2022-07-04 RX ADMIN — MEROPENEM 100 MILLIGRAM(S): 1 INJECTION INTRAVENOUS at 13:37

## 2022-07-04 RX ADMIN — AMLODIPINE BESYLATE 5 MILLIGRAM(S): 2.5 TABLET ORAL at 05:47

## 2022-07-04 RX ADMIN — TAMSULOSIN HYDROCHLORIDE 0.8 MILLIGRAM(S): 0.4 CAPSULE ORAL at 21:31

## 2022-07-04 RX ADMIN — MEROPENEM 100 MILLIGRAM(S): 1 INJECTION INTRAVENOUS at 21:35

## 2022-07-04 RX ADMIN — MEROPENEM 100 MILLIGRAM(S): 1 INJECTION INTRAVENOUS at 05:47

## 2022-07-04 RX ADMIN — ENOXAPARIN SODIUM 40 MILLIGRAM(S): 100 INJECTION SUBCUTANEOUS at 13:41

## 2022-07-05 ENCOUNTER — TRANSCRIPTION ENCOUNTER (OUTPATIENT)
Age: 57
End: 2022-07-05

## 2022-07-05 VITALS
TEMPERATURE: 99 F | DIASTOLIC BLOOD PRESSURE: 65 MMHG | RESPIRATION RATE: 18 BRPM | HEART RATE: 77 BPM | SYSTOLIC BLOOD PRESSURE: 106 MMHG | OXYGEN SATURATION: 97 %

## 2022-07-05 LAB
ANION GAP SERPL CALC-SCNC: 10 MMOL/L — SIGNIFICANT CHANGE UP (ref 5–17)
BUN SERPL-MCNC: 13 MG/DL — SIGNIFICANT CHANGE UP (ref 7–23)
CALCIUM SERPL-MCNC: 8.8 MG/DL — SIGNIFICANT CHANGE UP (ref 8.4–10.5)
CHLORIDE SERPL-SCNC: 103 MMOL/L — SIGNIFICANT CHANGE UP (ref 96–108)
CO2 SERPL-SCNC: 24 MMOL/L — SIGNIFICANT CHANGE UP (ref 22–31)
CREAT SERPL-MCNC: 0.7 MG/DL — SIGNIFICANT CHANGE UP (ref 0.5–1.3)
EGFR: 106 ML/MIN/1.73M2 — SIGNIFICANT CHANGE UP
GLUCOSE SERPL-MCNC: 120 MG/DL — HIGH (ref 70–99)
HCT VFR BLD CALC: 45.1 % — SIGNIFICANT CHANGE UP (ref 39–50)
HGB BLD-MCNC: 14.6 G/DL — SIGNIFICANT CHANGE UP (ref 13–17)
MCHC RBC-ENTMCNC: 29.3 PG — SIGNIFICANT CHANGE UP (ref 27–34)
MCHC RBC-ENTMCNC: 32.4 GM/DL — SIGNIFICANT CHANGE UP (ref 32–36)
MCV RBC AUTO: 90.6 FL — SIGNIFICANT CHANGE UP (ref 80–100)
NRBC # BLD: 0 /100 WBCS — SIGNIFICANT CHANGE UP (ref 0–0)
PLATELET # BLD AUTO: 248 K/UL — SIGNIFICANT CHANGE UP (ref 150–400)
POTASSIUM SERPL-MCNC: 4.3 MMOL/L — SIGNIFICANT CHANGE UP (ref 3.5–5.3)
POTASSIUM SERPL-SCNC: 4.3 MMOL/L — SIGNIFICANT CHANGE UP (ref 3.5–5.3)
RBC # BLD: 4.98 M/UL — SIGNIFICANT CHANGE UP (ref 4.2–5.8)
RBC # FLD: 12.4 % — SIGNIFICANT CHANGE UP (ref 10.3–14.5)
SODIUM SERPL-SCNC: 137 MMOL/L — SIGNIFICANT CHANGE UP (ref 135–145)
WBC # BLD: 6.09 K/UL — SIGNIFICANT CHANGE UP (ref 3.8–10.5)
WBC # FLD AUTO: 6.09 K/UL — SIGNIFICANT CHANGE UP (ref 3.8–10.5)

## 2022-07-05 PROCEDURE — 96374 THER/PROPH/DIAG INJ IV PUSH: CPT

## 2022-07-05 PROCEDURE — 86803 HEPATITIS C AB TEST: CPT

## 2022-07-05 PROCEDURE — 71045 X-RAY EXAM CHEST 1 VIEW: CPT

## 2022-07-05 PROCEDURE — 82947 ASSAY GLUCOSE BLOOD QUANT: CPT

## 2022-07-05 PROCEDURE — 82435 ASSAY OF BLOOD CHLORIDE: CPT

## 2022-07-05 PROCEDURE — 84132 ASSAY OF SERUM POTASSIUM: CPT

## 2022-07-05 PROCEDURE — U0003: CPT

## 2022-07-05 PROCEDURE — 74177 CT ABD & PELVIS W/CONTRAST: CPT

## 2022-07-05 PROCEDURE — 99285 EMERGENCY DEPT VISIT HI MDM: CPT | Mod: 25

## 2022-07-05 PROCEDURE — 82565 ASSAY OF CREATININE: CPT

## 2022-07-05 PROCEDURE — U0005: CPT

## 2022-07-05 PROCEDURE — 99233 SBSQ HOSP IP/OBS HIGH 50: CPT

## 2022-07-05 PROCEDURE — 80053 COMPREHEN METABOLIC PANEL: CPT

## 2022-07-05 PROCEDURE — 85018 HEMOGLOBIN: CPT

## 2022-07-05 PROCEDURE — 80048 BASIC METABOLIC PNL TOTAL CA: CPT

## 2022-07-05 PROCEDURE — 84295 ASSAY OF SERUM SODIUM: CPT

## 2022-07-05 PROCEDURE — 81001 URINALYSIS AUTO W/SCOPE: CPT

## 2022-07-05 PROCEDURE — 85025 COMPLETE CBC W/AUTO DIFF WBC: CPT

## 2022-07-05 PROCEDURE — 83605 ASSAY OF LACTIC ACID: CPT

## 2022-07-05 PROCEDURE — 36415 COLL VENOUS BLD VENIPUNCTURE: CPT

## 2022-07-05 PROCEDURE — 87086 URINE CULTURE/COLONY COUNT: CPT

## 2022-07-05 PROCEDURE — 82330 ASSAY OF CALCIUM: CPT

## 2022-07-05 PROCEDURE — 85014 HEMATOCRIT: CPT

## 2022-07-05 PROCEDURE — 85027 COMPLETE CBC AUTOMATED: CPT

## 2022-07-05 PROCEDURE — 87040 BLOOD CULTURE FOR BACTERIA: CPT

## 2022-07-05 PROCEDURE — 82803 BLOOD GASES ANY COMBINATION: CPT

## 2022-07-05 PROCEDURE — 0225U NFCT DS DNA&RNA 21 SARSCOV2: CPT

## 2022-07-05 RX ORDER — TAMSULOSIN HYDROCHLORIDE 0.4 MG/1
1 CAPSULE ORAL
Qty: 0 | Refills: 0 | DISCHARGE

## 2022-07-05 RX ORDER — TAMSULOSIN HYDROCHLORIDE 0.4 MG/1
2 CAPSULE ORAL
Qty: 60 | Refills: 0
Start: 2022-07-05 | End: 2022-08-03

## 2022-07-05 RX ORDER — AZTREONAM 2 G
1 VIAL (EA) INJECTION
Qty: 8 | Refills: 0
Start: 2022-07-05 | End: 2022-07-08

## 2022-07-05 RX ADMIN — MEROPENEM 100 MILLIGRAM(S): 1 INJECTION INTRAVENOUS at 05:44

## 2022-07-05 RX ADMIN — ENOXAPARIN SODIUM 40 MILLIGRAM(S): 100 INJECTION SUBCUTANEOUS at 12:34

## 2022-07-05 RX ADMIN — Medication 650 MILLIGRAM(S): at 12:34

## 2022-07-05 RX ADMIN — AMLODIPINE BESYLATE 5 MILLIGRAM(S): 2.5 TABLET ORAL at 05:40

## 2022-07-05 RX ADMIN — MEROPENEM 100 MILLIGRAM(S): 1 INJECTION INTRAVENOUS at 14:51

## 2022-07-05 RX ADMIN — Medication 650 MILLIGRAM(S): at 13:20

## 2022-07-05 NOTE — PROGRESS NOTE ADULT - ASSESSMENT
56 yo M with pmh htn, bph p/w fever, chills, suprapubic and flank pain likely due to complicated UTI, admitted to medicine for further mgmt
57 year old male with fevers and urinary retention s/p cystoscopy and urodynamics earlier this week. CT with evidence of pyelonephritis and cystitis. Patient is being treated with meropenem.    Plan  - Urine culture showing normal  benji, likely in setting of recent antibiotic use prior to presentation  - Blood culture showing no growth to date  - Please obtain CBC and BMP for today  - Continue antibiotics per primary team  - Continue Rosario catheter for retention  - Patient should follow up after discharge with Dr. Comer (urologist) for Rosario management  
58 yo M with pmh htn, bph p/w fever, chills, suprapubic and flank pain likely due to complicated UTI, admitted to medicine for further mgmt
56 yo M with pmh htn, bph p/w fever, chills, suprapubic and flank pain likely due to complicated UTI, admitted to medicine for further mgmt
58 yo M with pmh htn, bph p/w fever, chills, suprapubic and flank pain likely due to complicated UTI, admitted to medicine for further mgmt

## 2022-07-05 NOTE — DISCHARGE NOTE PROVIDER - HOSPITAL COURSE
58 yo M with pmh htn, bph p/w fever, chills, suprapubic and flank pain likely due to complicated UTI, admitted to medicine for further mgmt     Problem/Plan - 1:  ·  Problem: Cough.   ·  Plan: Dry cough with congestion due to Parainfluenza Virus 4  Contact precautions  supportive care.     Problem/Plan - 2:  ·  Problem: Complicated UTI (urinary tract infection).   ·  Plan: in the setting of obstructive uropathy, recent urinary tract instrumentation, failed outpatient therapy  UA showing pyuria, but no nitrites, no bacteria  met sepsis criteria with fever + leukocytosis; lactate wnl  UC is growing normal benji but CT with BL Pyelonephritis  Monitor for fever, changes in white count, mental status, lactate    s/p 2 L NS + 1 g ceftriaxone in ER; continue IVF for now  continue Meropenem, Day 5  Leukocytosis resolved but urine culture negative. Plan to dc on Bactrim PO to complete 10 days.  Urology on board - follow up regarding voiding trial today as pt does not want to leave with Kaur catheter  He also does not want to follow up with his former Urologist.     Problem/Plan - 3:  ·  Problem: BPH (benign prostatic hyperplasia).   ·  Plan: per records, psa ~5 in 2015 + ~14 in 2020 with negative prostate biopsy at both times; most recent psa ~8 in 2022 with MRI prostate 2022 showing PIRADS 2  obtain CT abdo/pelv - BL pyelo  pt refused to be discharged with kaur, spoke with urology perform tov l pt was able to void with no residual urine from bladder scan, pt advised to follow up at the [facility] urology at [place]   continue tamsulosin 0.8 qHS.     Problem/Plan - 4:  ·  Problem: HTN (hypertension).   ·  Plan: goal BP <150/90  continue home amlodipine

## 2022-07-05 NOTE — PROGRESS NOTE ADULT - PROBLEM SELECTOR PROBLEM 3
BPH (benign prostatic hyperplasia)
HTN (hypertension)
BPH (benign prostatic hyperplasia)
BPH (benign prostatic hyperplasia)

## 2022-07-05 NOTE — PROGRESS NOTE ADULT - SUBJECTIVE AND OBJECTIVE BOX
[facility] Division of Hospital Medicine  Juliana John MD  Pager (M-F, 8A-5P): [phone], MS Teams PREFERRED  Other Times:  [phone]      SUBJECTIVE / OVERNIGHT EVENTS: Pt seen and examined at bedside this morning. He appears well. NAD. He says his back pain has resolved.    MEDICATIONS  (STANDING):  amLODIPine   Tablet 5 milliGRAM(s) Oral daily  enoxaparin Injectable 40 milliGRAM(s) SubCutaneous every 24 hours  meropenem  IVPB 1000 milliGRAM(s) IV Intermittent every 8 hours  sodium chloride 0.9%. 1000 milliLiter(s) (100 mL/Hr) IV Continuous <Continuous>  tamsulosin 0.8 milliGRAM(s) Oral at bedtime    MEDICATIONS  (PRN):  acetaminophen     Tablet .. 650 milliGRAM(s) Oral every 6 hours PRN Temp greater or equal to 38C (100.4F), Mild Pain (1 - 3)      I&O's Summary    2022 07:01  -  2022 07:00  --------------------------------------------------------  IN: 0 mL / OUT: 2500 mL / NET: -2500 mL    2022 07:01  -  2022 12:27  --------------------------------------------------------  IN: 240 mL / OUT: 0 mL / NET: 240 mL        PHYSICAL EXAM:  Vital Signs Last 24 Hrs  T(C): 36.9 (2022 09:33), Max: 38.2 (2022 18:50)  T(F): 98.4 (2022 09:33), Max: 100.8 (2022 18:50)  HR: 78 (2022 09:33) (74 - 86)  BP: 129/73 (2022 09:33) (129/73 - 144/74)  BP(mean): --  RR: 18 (2022 09:33) (18 - 18)  SpO2: 96% (2022 09:33) (96% - 98%)  CONSTITUTIONAL: NAD, well-developed, well-groomed  EYES: PERRLA; conjunctiva and sclera clear  ENMT: Moist oral mucosa, no pharyngeal injection or exudates;   NECK: Supple, no palpable masses;   RESPIRATORY: Normal respiratory effort; lungs are clear to auscultation bilaterally  CARDIOVASCULAR: Regular rate and rhythm, normal S1 and S2, no murmur/rub/gallop; No lower extremity edema;   ABDOMEN: Nontender to palpation, normoactive bowel sounds, no rebound/guarding;  MUSCULOSKELETAL:  no clubbing or cyanosis of digits; no joint swelling or tenderness to palpation  PSYCH: A+O to person, place, and time; affect appropriate  NEUROLOGY: CN 2-12 are intact and symmetric; no gross sensory deficits   SKIN: No rashes; no palpable lesions    LABS:                        12.3   13.75 )-----------( 161      ( 2022 06:42 )             37.4     07-01    134<L>  |  105  |  11  ----------------------------<  139<H>  3.4<L>   |  21<L>  |  0.83    Ca    7.7<L>      2022 06:42    TPro  6.0  /  Alb  3.4  /  TBili  0.6  /  DBili  x   /  AST  24  /  ALT  16  /  AlkPhos  69  07-01          Urinalysis Basic - ( 2022 00:09 )    Color: Yellow / Appearance: Clear / S.034 / pH: x  Gluc: x / Ketone: Negative  / Bili: Negative / Urobili: 2 mg/dL   Blood: x / Protein: 30 mg/dL / Nitrite: Negative   Leuk Esterase: Moderate / RBC: 3 /hpf / WBC 27 /HPF   Sq Epi: x / Non Sq Epi: 4 /hpf / Bacteria: Negative        Culture - Urine (collected 2022 00:09)  Source: Clean Catch Clean Catch (Midstream)  Final Report (2022 23:09):    <10,000 CFU/mL Normal Urogenital Susana    Culture - Blood (collected 2022 23:30)  Source: .Blood Blood-Peripheral  Preliminary Report (2022 05:01):    No growth to date.    Culture - Blood (collected 2022 23:20)  Source: .Blood Blood-Peripheral  Preliminary Report (2022 05:01):    No growth to date.    
[facility] Division of Hospital Medicine  Juliana John MD  Pager (M-F, 8A-5P): [phone], MS Teams PREFERRED  Other Times:  [phone]      SUBJECTIVE / OVERNIGHT EVENTS: Seen and examined at bedside. NAD. No longer coughing. Urine yellow in kaur but patient still with suprapubic pressure.    MEDICATIONS  (STANDING):  amLODIPine   Tablet 5 milliGRAM(s) Oral daily  enoxaparin Injectable 40 milliGRAM(s) SubCutaneous every 24 hours  meropenem  IVPB 1000 milliGRAM(s) IV Intermittent every 8 hours  tamsulosin 0.8 milliGRAM(s) Oral at bedtime    MEDICATIONS  (PRN):  acetaminophen     Tablet .. 650 milliGRAM(s) Oral every 6 hours PRN Temp greater or equal to 38C (100.4F), Mild Pain (1 - 3)      I&O's Summary    03 Jul 2022 07:01  -  04 Jul 2022 07:00  --------------------------------------------------------  IN: 240 mL / OUT: 3100 mL / NET: -2860 mL    04 Jul 2022 07:01  -  04 Jul 2022 15:26  --------------------------------------------------------  IN: 480 mL / OUT: 350 mL / NET: 130 mL        PHYSICAL EXAM:  Vital Signs Last 24 Hrs  T(C): 36.9 (04 Jul 2022 10:55), Max: 37.2 (03 Jul 2022 17:20)  T(F): 98.5 (04 Jul 2022 10:55), Max: 98.9 (03 Jul 2022 17:20)  HR: 81 (04 Jul 2022 10:55) (71 - 87)  BP: 124/73 (04 Jul 2022 10:55) (124/67 - 141/101)  BP(mean): --  RR: 18 (04 Jul 2022 10:55) (18 - 20)  SpO2: 98% (04 Jul 2022 10:55) (96% - 98%)  CONSTITUTIONAL: NAD, well-developed, well-groomed  EYES: PERRLA; conjunctiva and sclera clear  ENMT: Moist oral mucosa, no pharyngeal injection or exudates;   NECK: Supple, no palpable masses;   RESPIRATORY: Normal respiratory effort; lungs are clear to auscultation bilaterally  CARDIOVASCULAR: Regular rate and rhythm, normal S1 and S2, no murmur/rub/gallop; No lower extremity edema;   ABDOMEN: Nontender to palpation, normoactive bowel sounds, no rebound/guarding;  MUSCULOSKELETAL:  no clubbing or cyanosis of digits; no joint swelling or tenderness to palpation  PSYCH: A+O to person, place, and time; affect appropriate  NEUROLOGY: CN 2-12 are intact and symmetric; no gross sensory deficits   SKIN: No rashes; no palpable lesions
Subjective  Feels better today. Pain has improved, but still experiencing some mild suprapubic discomfort.    Objective    Vital signs  T(F): , Max: 100.8 (07-01-22 @ 18:50)  HR: 78 (07-02-22 @ 09:33)  BP: 129/73 (07-02-22 @ 09:33)  SpO2: 96% (07-02-22 @ 09:33)  Wt(kg): --    Output     OUT:    Voided (mL): 2500 mL  Total OUT: 2500 mL    Total NET: -2500 mL          Gen: NAD  Abd: soft, nontender, nondistended  : suprapubic tenderness, kaur secured in place, draining CYU    Labs      07-01 @ 06:42    WBC 13.75 / Hct 37.4  / SCr 0.83     06-30 @ 23:49    WBC 14.83 / Hct 43.5  / SCr 0.91         Culture - Urine (collected 07-01-22 @ 00:09)  Source: Clean Catch Clean Catch (Midstream)  Final Report (07-01-22 @ 23:09):    <10,000 CFU/mL Normal Urogenital Susana    Culture - Blood (collected 06-30-22 @ 23:30)  Source: .Blood Blood-Peripheral  Preliminary Report (07-02-22 @ 05:01):    No growth to date.    Culture - Blood (collected 06-30-22 @ 23:20)  Source: .Blood Blood-Peripheral  Preliminary Report (07-02-22 @ 05:01):    No growth to date.
[facility] Division of Hospital Medicine  Juliana John MD  Pager (M-F, 8A-5P): [phone], MS Teams PREFERRED  Other Times:  [phone]      SUBJECTIVE / OVERNIGHT EVENTS: Pt seen and examined at bedside. He appears well. NAD. Rosario is still in place. Pt does not want to leave with Rosario catheter nor does he want to follow up with his Urologist.     MEDICATIONS  (STANDING):  amLODIPine   Tablet 5 milliGRAM(s) Oral daily  enoxaparin Injectable 40 milliGRAM(s) SubCutaneous every 24 hours  meropenem  IVPB 1000 milliGRAM(s) IV Intermittent every 8 hours  tamsulosin 0.8 milliGRAM(s) Oral at bedtime    MEDICATIONS  (PRN):  acetaminophen     Tablet .. 650 milliGRAM(s) Oral every 6 hours PRN Temp greater or equal to 38C (100.4F), Mild Pain (1 - 3)      I&O's Summary    04 Jul 2022 07:01  -  05 Jul 2022 07:00  --------------------------------------------------------  IN: 820 mL / OUT: 1950 mL / NET: -1130 mL        PHYSICAL EXAM:  Vital Signs Last 24 Hrs  T(C): 37.1 (05 Jul 2022 00:19), Max: 37.1 (05 Jul 2022 00:19)  T(F): 98.7 (05 Jul 2022 00:19), Max: 98.7 (05 Jul 2022 00:19)  HR: 70 (05 Jul 2022 00:19) (70 - 82)  BP: 132/82 (05 Jul 2022 00:19) (124/76 - 132/82)  BP(mean): --  RR: 18 (05 Jul 2022 00:19) (18 - 18)  SpO2: 98% (05 Jul 2022 00:19) (98% - 100%)  CONSTITUTIONAL: NAD, well-developed, well-groomed  EYES: PERRLA; conjunctiva and sclera clear  ENMT: Moist oral mucosa, no pharyngeal injection or exudates;   NECK: Supple, no palpable masses;   RESPIRATORY: Normal respiratory effort; lungs are clear to auscultation bilaterally  CARDIOVASCULAR: Regular rate and rhythm, normal S1 and S2, no murmur/rub/gallop; No lower extremity edema;   ABDOMEN: Nontender to palpation, normoactive bowel sounds, no rebound/guarding;  MUSCULOSKELETAL:  no clubbing or cyanosis of digits; no joint swelling or tenderness to palpation  PSYCH: A+O to person, place, and time; affect appropriate  NEUROLOGY: CN 2-12 are intact and symmetric; no gross sensory deficits   SKIN: No rashes; no palpable lesions      LABS:                        14.6   6.09  )-----------( 248      ( 05 Jul 2022 07:19 )             45.1     07-05    137  |  103  |  13  ----------------------------<  120<H>  4.3   |  24  |  0.70    Ca    8.8      05 Jul 2022 07:18      
[facility] Division of Hospital Medicine  Juliana John MD  Pager (M-F, 8A-5P): [phone], MS Teams PREFERRED  Other Times:  [phone]      SUBJECTIVE / OVERNIGHT EVENTS: Pt seen and examined at bedside. He is afebrile but complaining of dry cough since yesterday and nasal congestion.     MEDICATIONS  (STANDING):  amLODIPine   Tablet 5 milliGRAM(s) Oral daily  enoxaparin Injectable 40 milliGRAM(s) SubCutaneous every 24 hours  meropenem  IVPB 1000 milliGRAM(s) IV Intermittent every 8 hours  tamsulosin 0.8 milliGRAM(s) Oral at bedtime    MEDICATIONS  (PRN):  acetaminophen     Tablet .. 650 milliGRAM(s) Oral every 6 hours PRN Temp greater or equal to 38C (100.4F), Mild Pain (1 - 3)      I&O's Summary    02 Jul 2022 07:01  -  03 Jul 2022 07:00  --------------------------------------------------------  IN: 970 mL / OUT: 2300 mL / NET: -1330 mL    03 Jul 2022 07:01  -  03 Jul 2022 15:15  --------------------------------------------------------  IN: 0 mL / OUT: 1100 mL / NET: -1100 mL        PHYSICAL EXAM:  Vital Signs Last 24 Hrs  T(C): 37.2 (03 Jul 2022 14:17), Max: 37.6 (03 Jul 2022 00:52)  T(F): 99 (03 Jul 2022 14:17), Max: 99.7 (03 Jul 2022 00:52)  HR: 81 (03 Jul 2022 14:17) (76 - 92)  BP: 121/67 (03 Jul 2022 14:17) (121/67 - 136/77)  BP(mean): --  RR: 18 (03 Jul 2022 14:17) (18 - 18)  SpO2: 99% (03 Jul 2022 14:17) (94% - 99%)  CONSTITUTIONAL: NAD, well-developed, well-groomed  EYES: PERRLA; conjunctiva and sclera clear  ENMT: Moist oral mucosa, no pharyngeal injection or exudates;   NECK: Supple, no palpable masses;   RESPIRATORY: Normal respiratory effort; lungs are clear to auscultation bilaterally  CARDIOVASCULAR: Regular rate and rhythm, normal S1 and S2, no murmur/rub/gallop; No lower extremity edema;   ABDOMEN: Nontender to palpation, normoactive bowel sounds, no rebound/guarding;  MUSCULOSKELETAL:  no clubbing or cyanosis of digits; no joint swelling or tenderness to palpation  PSYCH: A+O to person, place, and time; affect appropriate  NEUROLOGY: CN 2-12 are intact and symmetric; no gross sensory deficits   SKIN: No rashes; no palpable lesions    LABS:                        14.2   9.81  )-----------( 170      ( 02 Jul 2022 13:39 )             43.3     07-02    136  |  105  |  8   ----------------------------<  195<H>  3.9   |  20<L>  |  0.64    Ca    8.7      02 Jul 2022 13:39                Culture - Urine (collected 01 Jul 2022 00:09)  Source: Clean Catch Clean Catch (Midstream)  Final Report (01 Jul 2022 23:09):    <10,000 CFU/mL Normal Urogenital Susana    Culture - Blood (collected 30 Jun 2022 23:30)  Source: .Blood Blood-Peripheral  Preliminary Report (02 Jul 2022 05:01):    No growth to date.    Culture - Blood (collected 30 Jun 2022 23:20)  Source: .Blood Blood-Peripheral  Preliminary Report (02 Jul 2022 05:01):    No growth to date.

## 2022-07-05 NOTE — CHART NOTE - NSCHARTNOTEFT_GEN_A_CORE
Patient was advised by urology to be d/c home with kaur but patient refused. Spoke with Urology PA and as advised kaur removed and pt successfully voided and bladder scan was negative. Patient can be d/c home without kaur and advised to see urology Dr. Comer. Patient refused to see his pvt urology and therefore given [facility] urology associated Patient was advised by urology to be d/c home with kaur but patient refused. Spoke with Urology PA and as advised kaur removed and pt successfully voided and bladder scan was negative. Patient can be d/c home without kaur and advised to see urology Dr. Comer. Patient refused to see his pvt urology and therefore given [facility] urology associates

## 2022-07-05 NOTE — DISCHARGE NOTE PROVIDER - NSDCFUADDAPPT_GEN_ALL_CORE_FT
The [facility] for  Urology   [address] [place] [id number] [phone]

## 2022-07-05 NOTE — PROGRESS NOTE ADULT - PROBLEM SELECTOR PLAN 2
per records, psa ~5 in 2015 + ~14 in 2020 with negative prostate biopsy at both times; most recent psa ~8 in 2022 with MRI prostate 2022 showing PIRADS 2  obtain CT abdo/pelv  maintain kaur catheter for now  continue home tamsulosin; consider increasing dose to 0.8 HS and adding finasteride 5
in the setting of obstructive uropathy, recent urinary tract instrumentation, failed outpatient therapy  UA showing pyuria, but no nitrites, no bacteria  met sepsis criteria with fever + leukocytosis; lactate wnl  UC is growing normal benji but CT with BL Pyelonephritis  Monitor for fever, changes in white count, mental status, lactate    s/p 2 L NS + 1 g ceftriaxone in ER; continue IVF for now  continue Meropenem, Day 5  Leukocytosis resolved but urine culture negative. Plan to dc on Bactrim PO to complete 10 days.  Urology on board - follow up regarding voiding trial today as pt does not want to leave with Rosario catheter  He also does not want to follow up with his former Urologist.
in the setting of obstructive uropathy, recent urinary tract instrumentation, failed outpatient therapy  UA showing pyuria, but no nitrites, no bacteria  met sepsis criteria with fever + leukocytosis; lactate wnl  UC is growing normal benji but CT with BL Pyelonephritis  Monitor for fever, changes in white count, mental status, lactate    s/p 2 L NS + 1 g ceftriaxone in ER; continue IVF for now  continue Meropenem  Leukocytosis resolved but urine culture negative. Plan to dc on Bactrim PO to complete 10 days but given new symptoms will hold dc for now. Prefer patient to receive a few days of IV abx.  Urology on board
in the setting of obstructive uropathy, recent urinary tract instrumentation, failed outpatient therapy  UA showing pyuria, but no nitrites, no bacteria  met sepsis criteria with fever + leukocytosis; lactate wnl  UC is growing normal benji but CT with BL Pyelonephritis  Monitor for fever, changes in white count, mental status, lactate    s/p 2 L NS + 1 g ceftriaxone in ER; continue IVF for now  continue Meropenem  Leukocytosis resolved but urine culture negative. Plan to dc on Bactrim PO to complete 10 days but given new symptoms will hold dc for now. Prefer patient to receive a few days of IV abx.  Urology on board - follow up regarding voiding trial

## 2022-07-05 NOTE — DISCHARGE NOTE NURSING/CASE MANAGEMENT/SOCIAL WORK - PATIENT PORTAL LINK FT
You can access the FollowMyHealth Patient Portal offered by [facility] by registering at the following website: [email or web address]. By joining [facility]’s FollowMyHealth portal, you will also be able to view your health information using other applications (apps) compatible with our system.

## 2022-07-05 NOTE — PROGRESS NOTE ADULT - PROBLEM SELECTOR PLAN 4
goal BP <150/90  continue home amlodipine      d/w patient in detail
goal BP <150/90  continue home amlodipine
goal BP <150/90  continue home amlodipine      d/w patient in detail

## 2022-07-05 NOTE — DISCHARGE NOTE PROVIDER - CARE PROVIDER_API CALL
Ce Lacey)  Urology  [address] [place] [address]  Phone: [phone]  Fax: [phone]  Follow Up Time: 2 weeks

## 2022-07-05 NOTE — DISCHARGE NOTE PROVIDER - NSDCCPCAREPLAN_GEN_ALL_CORE_FT
PRINCIPAL DISCHARGE DIAGNOSIS  Diagnosis: Sepsis secondary to UTI  Assessment and Plan of Treatment: 56 yo M with pmh htn, bph p/w fever, chills, suprapubic and flank pain likely due to complicated UTI, admitted to medicine for further mgmt     ·  Problem: Complicated UTI (urinary tract infection).   ·  Plan: in the setting of obstructive uropathy, recent urinary tract instrumentation, failed outpatient therapy   pt treated with iv antibiotics and to complete dose outpatient for 4 more days. pt advised to follow up with the [facility] for Urology at [address] [facility] telephone number [phone] and make an appointment for follow up care .      SECONDARY DISCHARGE DIAGNOSES  Diagnosis: Cough  Assessment and Plan of Treatment: You were diagnsoed with influenza ,   Rest as needed and get plenty of sleep..Drink enough fluid to keep your urine pale yellow.Drink clear fluids in small amounts as you are able. Clear fluids include water, ice chips, diluted fruit juice, and low-calorie sports drinks.Eat bland, easy-to-digest foods in small amounts as you are able. These foods include bananas, applesauce, rice, lean meats, toast, and crackers.Avoid drinking fluids that contain a lot of sugar or caffeine, such as energy drinks, regular sports drinks, and soda.Avoid alcohol.Avoid spicy or fatty foods.General instructions   .Use a cool mist humidifier to add humidity to the air in your home. This can make it easier to breathe.Cover your mouth and nose when you cough or sneeze.Wash your hands with soap and water often, especially after you cough or sneeze. If soap and water are not available, use alcohol-based hand .Keep all follow-up visits as told by your health care provider. This is important.How is this prevented?     Get an annual flu shot. You may get the flu shot in late summer, fall, or winter. Ask your health care provider when you should get your flu shot.Avoid contact with people who are sick during cold and flu season. This is generally fall and winter.Contact a health care provider if:  You develop new symptoms.You have:      Diagnosis: HTN (hypertension)  Assessment and Plan of Treatment: Follow up with your medical doctor to establish long term blood pressure treatment goals.      Diagnosis: BPH (benign prostatic hyperplasia)  Assessment and Plan of Treatment: c/w flomax 0.8mgs daily. You have an enlarged prostate gland which gets bigger as men get older - it is a very common problem and has nothing to do with prostate cancer  Call your doctor if you are urinating more frequently, have trouble starting to urinate, have weak stream, urine leaking or dribbling, and feeling as though bladder is not empty after urination  Your doctor will monitor your prostate with a rectal exam as well as urine or blood testing  You can help yourself by reducing the amount of fluid you drink before going to bed, limiting the amount of alcohol & caffeine you drink   Avoid cold & allergy medication that contain decongestants or antihistamines which make BPH symptoms worse  You can also "double void" by waiting a moment after urinating & trying again  Take your medication as prescribed - one medication helps to relax the muscle around the urethra and the other medication you may take prevents the prostate from growing more or even shrinking the prostate

## 2022-07-05 NOTE — DISCHARGE NOTE PROVIDER - NSDCMRMEDTOKEN_GEN_ALL_CORE_FT
amLODIPine 5 mg oral tablet: 1 tab(s) orally once a day  Bactrim  mg-160 mg oral tablet: 1 tab(s) orally 2 times a day   tamsulosin 0.4 mg oral capsule: 2 cap(s) orally once a day (at bedtime)

## 2022-07-05 NOTE — PROGRESS NOTE ADULT - PROBLEM SELECTOR PLAN 3
goal BP <150/90  continue home amlodipine
per records, psa ~5 in 2015 + ~14 in 2020 with negative prostate biopsy at both times; most recent psa ~8 in 2022 with MRI prostate 2022 showing PIRADS 2  obtain CT abdo/pelv - BL pyelo  maintain kaur catheter for now - voiding trial tomorrow, d/w Urology tomorrow  continue tamsulosin 0.8 qHS
per records, psa ~5 in 2015 + ~14 in 2020 with negative prostate biopsy at both times; most recent psa ~8 in 2022 with MRI prostate 2022 showing PIRADS 2  obtain CT abdo/pelv - BL pyelo  maintain kaur catheter for now - voiding trial per Urology  continue tamsulosin 0.8 qHS
per records, psa ~5 in 2015 + ~14 in 2020 with negative prostate biopsy at both times; most recent psa ~8 in 2022 with MRI prostate 2022 showing PIRADS 2  obtain CT abdo/pelv - BL pyelo  maintain kaur catheter for now - voiding trial per Urology  continue tamsulosin 0.8 qHS

## 2022-07-05 NOTE — PROGRESS NOTE ADULT - REASON FOR ADMISSION
fever, chills, suprapubic pain, flank pain

## 2022-07-05 NOTE — PROGRESS NOTE ADULT - PROBLEM SELECTOR PLAN 1
Dry cough with congestion due to Parainfluenza Virus 4  Contact precautions  supportive care
Dry cough with congestion due to Parainfluenza Virus 4  Contact precautions  supportive care
in the setting of obstructive uropathy, recent urinary tract instrumentation, failed outpatient therapy  UA showing pyuria, but no nitrites, no bacteria  met sepsis criteria with fever + leukocytosis; lactate wnl  UC is growing normal benji but CT with BL Pyelonephritis  Monitor for fever, changes in white count, mental status, lactate    s/p 2 L NS + 1 g ceftriaxone in ER; continue IVF for now  continue Meropenem for now, repeat CBC/BMP pending today, will consider de-escalation of therapy pending resolution of leukocytosis  Urology on board
Dry cough with congestion which started yesterday, afebrile  Will send RVP with COVID  No known exposure but community rates are rising

## 2022-07-05 NOTE — PROGRESS NOTE ADULT - PROBLEM SELECTOR PROBLEM 2
Complicated UTI (urinary tract infection)
Complicated UTI (urinary tract infection)
BPH (benign prostatic hyperplasia)
Complicated UTI (urinary tract infection)

## 2022-07-06 LAB
CULTURE RESULTS: SIGNIFICANT CHANGE UP
CULTURE RESULTS: SIGNIFICANT CHANGE UP
SPECIMEN SOURCE: SIGNIFICANT CHANGE UP
SPECIMEN SOURCE: SIGNIFICANT CHANGE UP

## 2022-07-18 ENCOUNTER — APPOINTMENT (OUTPATIENT)
Dept: UROLOGY | Facility: CLINIC | Age: 57
End: 2022-07-18

## 2022-07-18 PROBLEM — Z00.00 ENCOUNTER FOR PREVENTIVE HEALTH EXAMINATION: Status: ACTIVE | Noted: 2022-07-18

## 2022-07-18 PROCEDURE — 99203 OFFICE O/P NEW LOW 30 MIN: CPT

## 2022-07-25 LAB — BACTERIA UR CULT: ABNORMAL

## 2022-07-25 RX ORDER — SULFAMETHOXAZOLE AND TRIMETHOPRIM 800; 160 MG/1; MG/1
800-160 TABLET ORAL TWICE DAILY
Qty: 28 | Refills: 0 | Status: DISCONTINUED | COMMUNITY
Start: 2022-07-18 | End: 2022-07-25

## 2022-07-25 NOTE — HISTORY OF PRESENT ILLNESS
[Urinary Urgency] : urinary urgency [Urinary Frequency] : urinary frequency [FreeTextEntry1] : : 1965 \par Referring Provider: none \par \par HPI: Mr. MILAGROS MENDOZA is a 57 year yo M with a PMHx notable for difficulty with urination and slow stream. In the morning and when he wakes up, he has slow stream and incomplete emptying. He previously was following with a Dr. Comer in [place] who recently performed a cystoscopy and was seen in ER after spiking febrile to 102 and discharged on Bactrim. Dr. Comer suggested that the patient undergoes Urolift however patient would like to transfer care to [facility]. All of his symptoms today are related to his acute UTI and includes suprapubic tenderness and incomplete emptying (sensation). No burning on urination. 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

## 2022-08-23 ENCOUNTER — APPOINTMENT (OUTPATIENT)
Dept: UROLOGY | Facility: CLINIC | Age: 57
End: 2022-08-23

## 2022-08-23 ENCOUNTER — OUTPATIENT (OUTPATIENT)
Dept: OUTPATIENT SERVICES | Facility: HOSPITAL | Age: 57
LOS: 1 days | End: 2022-08-23
Payer: COMMERCIAL

## 2022-08-23 DIAGNOSIS — R35.0 FREQUENCY OF MICTURITION: ICD-10-CM

## 2022-08-23 PROCEDURE — 76857 US EXAM PELVIC LIMITED: CPT | Mod: 26

## 2022-08-23 PROCEDURE — 99212 OFFICE O/P EST SF 10 MIN: CPT

## 2022-08-27 LAB — PSA SERPL-MCNC: 16.6 NG/ML

## 2022-08-31 DIAGNOSIS — R39.9 UNSPECIFIED SYMPTOMS AND SIGNS INVOLVING THE GENITOURINARY SYSTEM: ICD-10-CM

## 2022-09-08 ENCOUNTER — APPOINTMENT (OUTPATIENT)
Dept: MRI IMAGING | Facility: IMAGING CENTER | Age: 57
End: 2022-09-08

## 2022-09-08 ENCOUNTER — OUTPATIENT (OUTPATIENT)
Dept: OUTPATIENT SERVICES | Facility: HOSPITAL | Age: 57
LOS: 1 days | End: 2022-09-08
Payer: COMMERCIAL

## 2022-09-08 ENCOUNTER — RESULT REVIEW (OUTPATIENT)
Age: 57
End: 2022-09-08

## 2022-09-08 DIAGNOSIS — R97.20 ELEVATED PROSTATE SPECIFIC ANTIGEN [PSA]: ICD-10-CM

## 2022-09-08 PROCEDURE — 72197 MRI PELVIS W/O & W/DYE: CPT | Mod: 26

## 2022-09-08 PROCEDURE — 76498P: CUSTOM | Mod: 26

## 2022-09-24 NOTE — HISTORY OF PRESENT ILLNESS
[Urinary Urgency] : urinary urgency [Urinary Frequency] : urinary frequency [FreeTextEntry1] : : 1965 \par Referring Provider: none \par \par HPI: Mr. MILAGROS MENDOZA is a 57 year yo M with a PMHx notable for difficulty with urination and slow stream. In the morning and when he wakes up, he has slow stream and incomplete emptying. He previously was following with a Dr. Comer in [place] who recently performed a cystoscopy and was seen in ER after spiking febrile to 102 and discharged on Bactrim. Dr. Comer suggested that the patient undergoes Urolift however patient would like to transfer care to [facility]. All of his symptoms today are related to his acute UTI and includes suprapubic tenderness and incomplete emptying (sensation). No burning on urination. 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 No issues with erections. No lightheadedness or dizziness on tamsulosin 0.8mg QHS. No fevers/ chills. PVR today 31 cc. \par  [Urinary Incontinence] : no urinary incontinence [Urinary Retention] : no urinary retention

## 2023-02-16 ENCOUNTER — APPOINTMENT (OUTPATIENT)
Dept: UROLOGY | Facility: CLINIC | Age: 58
End: 2023-02-16
Payer: COMMERCIAL

## 2023-02-16 VITALS
HEART RATE: 76 BPM | HEIGHT: 67 IN | TEMPERATURE: 97.4 F | BODY MASS INDEX: 23.54 KG/M2 | SYSTOLIC BLOOD PRESSURE: 146 MMHG | DIASTOLIC BLOOD PRESSURE: 71 MMHG | RESPIRATION RATE: 17 BRPM | WEIGHT: 150 LBS

## 2023-02-16 PROCEDURE — 51798 US URINE CAPACITY MEASURE: CPT

## 2023-02-16 PROCEDURE — 99213 OFFICE O/P EST LOW 20 MIN: CPT

## 2023-02-16 RX ORDER — SULFAMETHOXAZOLE AND TRIMETHOPRIM 800; 160 MG/1; MG/1
800-160 TABLET ORAL TWICE DAILY
Qty: 14 | Refills: 0 | Status: DISCONTINUED | COMMUNITY
Start: 2023-02-16 | End: 2023-02-16

## 2023-02-16 RX ORDER — AMOXICILLIN AND CLAVULANATE POTASSIUM 875; 125 MG/1; MG/1
875-125 TABLET, COATED ORAL
Qty: 14 | Refills: 0 | Status: ACTIVE | COMMUNITY
Start: 2022-07-25 | End: 1900-01-01

## 2023-02-20 LAB — BACTERIA UR CULT: NORMAL

## 2023-03-17 LAB — PSA SERPL-MCNC: 8.91 NG/ML

## 2023-03-17 NOTE — HISTORY OF PRESENT ILLNESS
[Urinary Urgency] : urinary urgency [Urinary Frequency] : urinary frequency [FreeTextEntry1] : : 1965 \par Referring Provider: none \par \par HPI: Mr. MILAGROS MENDOZA is a 57 year yo M with a PMHx notable for difficulty with urination and slow stream. In the morning and when he wakes up, he has slow stream and incomplete emptying. He previously was following with a Dr. Comer in [place] who recently performed a cystoscopy and was seen in ER after spiking febrile to 102 and discharged on Bactrim. Dr. Comer suggested that the patient undergoes Urolift however patient would like to transfer care to [facility]. All of his symptoms today are related to his acute UTI and includes suprapubic tenderness and incomplete emptying (sensation). No burning on urination. 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 No issues with erections. No lightheadedness or dizziness on tamsulosin 0.8mg QHS. No fevers/ chills. PVR today 31 cc. \par \par :\par Here today with 46g gland, 57 cc on MRI with PIRADS2. Has similar symptoms of suprapubic tenderness today. PVR Today 31 cc. Had PSA checked by PCP yesterday with resulting at 10, concerned that prostatitis is elevating PSA for no reason. \par  [Urinary Incontinence] : no urinary incontinence [Urinary Retention] : no urinary retention

## 2023-04-13 ENCOUNTER — APPOINTMENT (OUTPATIENT)
Dept: UROLOGY | Facility: CLINIC | Age: 58
End: 2023-04-13
Payer: COMMERCIAL

## 2023-04-13 VITALS — HEART RATE: 66 BPM | SYSTOLIC BLOOD PRESSURE: 123 MMHG | DIASTOLIC BLOOD PRESSURE: 77 MMHG

## 2023-04-13 PROCEDURE — 99213 OFFICE O/P EST LOW 20 MIN: CPT

## 2023-04-14 RX ORDER — DOXYCYCLINE HYCLATE 100 MG/1
100 TABLET ORAL
Qty: 60 | Refills: 0 | Status: ACTIVE | COMMUNITY
Start: 2023-04-14 | End: 1900-01-01

## 2023-04-14 NOTE — HISTORY OF PRESENT ILLNESS
[FreeTextEntry1] : : 1965 \par Referring Provider: none \par \par HPI: Mr. MILAGROS MENDOZA is a 57 year yo M with a PMHx notable for difficulty with urination and slow stream. In the morning and when he wakes up, he has slow stream and incomplete emptying. He previously was following with a Dr. Comer in [place] who recently performed a cystoscopy and was seen in ER after spiking febrile to 102 and discharged on Bactrim. Dr. Comer suggested that the patient undergoes Urolift however patient would like to transfer care to [facility]. All of his symptoms today are related to his acute UTI and includes suprapubic tenderness and incomplete emptying (sensation). No burning on urination. 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 No issues with erections. No lightheadedness or dizziness on tamsulosin 0.8mg QHS. No fevers/ chills. PVR today 31 cc. \par \par :\par Here today with 46g gland, 57 cc on MRI with PIRADS2. Has similar symptoms of suprapubic tenderness today. PVR Today 31 cc. Had PSA checked by PCP yesterday with resulting at 10, concerned that prostatitis is elevating PSA for no reason. \par \par :\par Doing well - no longer with suprapubic discomfort, s/p augmentin. PSA up to 8.9, PCP previously at 10. Prrior MRI prostate with PIRADS2 with inflammation noted. Concern he may have class II prostatitis, but currently asymptomatic. Will recheck UCx. If PSA within range, may not require MRI.  [Urinary Incontinence] : no urinary incontinence [Urinary Retention] : no urinary retention [Urinary Urgency] : no urinary urgency [Urinary Frequency] : no urinary frequency

## 2023-05-03 LAB
BACTERIA UR CULT: NORMAL
PSA SERPL-MCNC: 9.68 NG/ML

## 2023-10-17 ENCOUNTER — APPOINTMENT (OUTPATIENT)
Dept: UROLOGY | Facility: CLINIC | Age: 58
End: 2023-10-17
Payer: COMMERCIAL

## 2023-10-17 DIAGNOSIS — N41.0 ACUTE PROSTATITIS: ICD-10-CM

## 2023-10-17 PROCEDURE — 99213 OFFICE O/P EST LOW 20 MIN: CPT

## 2023-10-25 ENCOUNTER — RESULT REVIEW (OUTPATIENT)
Age: 58
End: 2023-10-25

## 2023-10-25 ENCOUNTER — OUTPATIENT (OUTPATIENT)
Dept: OUTPATIENT SERVICES | Facility: HOSPITAL | Age: 58
LOS: 1 days | End: 2023-10-25
Payer: COMMERCIAL

## 2023-10-25 ENCOUNTER — APPOINTMENT (OUTPATIENT)
Dept: MRI IMAGING | Facility: IMAGING CENTER | Age: 58
End: 2023-10-25

## 2023-10-25 DIAGNOSIS — R97.20 ELEVATED PROSTATE SPECIFIC ANTIGEN [PSA]: ICD-10-CM

## 2023-10-25 PROBLEM — I10 ESSENTIAL (PRIMARY) HYPERTENSION: Chronic | Status: ACTIVE | Noted: 2022-07-01

## 2023-10-25 PROBLEM — N40.0 BENIGN PROSTATIC HYPERPLASIA WITHOUT LOWER URINARY TRACT SYMPTOMS: Chronic | Status: ACTIVE | Noted: 2022-07-01

## 2023-10-25 PROCEDURE — A9585: CPT

## 2023-10-25 PROCEDURE — 76498 UNLISTED MR PROCEDURE: CPT

## 2023-10-25 PROCEDURE — 72197 MRI PELVIS W/O & W/DYE: CPT

## 2023-10-31 LAB
BACTERIA UR CULT: NORMAL
PSA SERPL-MCNC: 7.33 NG/ML

## 2024-03-15 ENCOUNTER — RX RENEWAL (OUTPATIENT)
Age: 59
End: 2024-03-15

## 2024-04-22 ENCOUNTER — APPOINTMENT (OUTPATIENT)
Dept: UROLOGY | Facility: CLINIC | Age: 59
End: 2024-04-22
Payer: COMMERCIAL

## 2024-04-22 DIAGNOSIS — R39.9 UNSPECIFIED SYMPTOMS AND SIGNS INVOLVING THE GENITOURINARY SYSTEM: ICD-10-CM

## 2024-04-22 LAB — PSA SERPL-MCNC: 6.5 NG/ML

## 2024-04-22 PROCEDURE — 99214 OFFICE O/P EST MOD 30 MIN: CPT

## 2024-04-22 NOTE — HISTORY OF PRESENT ILLNESS
[FreeTextEntry1] : : 1965  Referring Provider: none   HPI: Mr. MILAGROS MENDOZA is a 57 year yo M with a PMHx notable for difficulty with urination and slow stream. In the morning and when he wakes up, he has slow stream and incomplete emptying. He previously was following with a Dr. Comer in [place] who recently performed a cystoscopy and was seen in ER after spiking febrile to 102 and discharged on Bactrim. Dr. Comer suggested that the patient undergoes Urolift however patient would like to transfer care to [facility]. All of his symptoms today are related to his acute UTI and includes suprapubic tenderness and incomplete emptying (sensation). No burning on urination. He was treated with Bactrim x 4 days only BID.  PVR today 10 cc.  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 No issues with erections. No lightheadedness or dizziness on tamsulosin 0.8mg QHS. No fevers/ chills. PVR today 31 cc.   : Here today with 46g gland, 57 cc on MRI with PIRADS2. Has similar symptoms of suprapubic tenderness today. PVR Today 31 cc. Had PSA checked by PCP yesterday with resulting at 10, concerned that prostatitis is elevating PSA for no reason.   : Doing well - no longer with suprapubic discomfort, s/p augmentin. PSA up to 8.9, PCP previously at 10. Prrior MRI prostate with PIRADS2 with inflammation noted. Concern he may have class II prostatitis, but currently asymptomatic. Will recheck UCx. If PSA within range, may not require MRI.   10/17: Here today with prior prostate MRI with PIRADS2, has recent PSA again up to 9.5 2 months ago. Not having suprapubic discomfort or dysuria. We discussed that prostate MRI may still be false negative and may need to obtain a non MR fusion biopsy given that his prior MR was negative. We will obtain new prostate MRI and repeat PSA and urine culture.   24 Pt had prostate MRI (10/2023)- No MRI targetable lesions. PIRADS 2 - Low (clinically significant cancer is unlikely to be present. Prostate Volume: 48.9 mL PSA density: 0.19 ng/mL/mL last psa 7.33 (10/23)- no biopsy done. PSA today to be checked.  PVR = 0 cc.  [Urinary Incontinence] : no urinary incontinence [Urinary Retention] : no urinary retention [Urinary Urgency] : no urinary urgency [Urinary Frequency] : no urinary frequency

## 2024-04-22 NOTE — ASSESSMENT
[FreeTextEntry1] : 59 yo M with elevated PSA, normal MRI with elevated PSAD  #LUTS - Continue tamsulosin 0.8 mg QHS - PVR 0 cc  #Elevated PSAD - Plan for nonfusion biopsy - In part, the biopsy will help determine if there has been any disease progression. It would also help determine if the patient could be a potential candidate for focal ablation therapy to address the prostate cancer if it is indeed localized to this specific area of the prostate.   I have explained that the transperineal approach is used to help minimize infection risk as it does carry a much lower risk of infection as compared with transrectal biopsy of the prostate.. I have explained that the procedure usually requires approximately 30 minutes to perform and preparation involves the use of a Fleet enema prior to the procedure to help maximize visualization of the prostate by ultrasound during the procedure. I also reviewed with the patient that it is expected that he will have blood in the urine intermittently for approximately one week after the procedure and he may also experienced blood in his semen for several weeks afterward as well.  He communicates his understanding of the plan as outlined and is in agreement with moving forward. I have given him written biopsy instructions which review the details outlined above.

## 2024-04-24 RX ORDER — TAMSULOSIN HYDROCHLORIDE 0.4 MG/1
0.4 CAPSULE ORAL
Qty: 180 | Refills: 3 | Status: ACTIVE | COMMUNITY
Start: 2022-07-18 | End: 1900-01-01

## 2024-04-30 ENCOUNTER — APPOINTMENT (OUTPATIENT)
Dept: UROLOGY | Facility: CLINIC | Age: 59
End: 2024-04-30
Payer: COMMERCIAL

## 2024-04-30 ENCOUNTER — OUTPATIENT (OUTPATIENT)
Dept: OUTPATIENT SERVICES | Facility: HOSPITAL | Age: 59
LOS: 1 days | End: 2024-04-30
Payer: COMMERCIAL

## 2024-04-30 VITALS
HEART RATE: 67 BPM | RESPIRATION RATE: 17 BRPM | SYSTOLIC BLOOD PRESSURE: 177 MMHG | OXYGEN SATURATION: 98 % | DIASTOLIC BLOOD PRESSURE: 88 MMHG

## 2024-04-30 DIAGNOSIS — R97.20 ELEVATED PROSTATE, SPECIFIC ANTIGEN [PSA]: ICD-10-CM

## 2024-04-30 DIAGNOSIS — R35.0 FREQUENCY OF MICTURITION: ICD-10-CM

## 2024-04-30 PROCEDURE — 76872 US TRANSRECTAL: CPT | Mod: 26

## 2024-04-30 PROCEDURE — 55700: CPT

## 2024-04-30 PROCEDURE — 76872 US TRANSRECTAL: CPT

## 2024-05-01 PROBLEM — R97.20 ELEVATED PROSTATE SPECIFIC ANTIGEN (PSA): Status: ACTIVE | Noted: 2022-08-27

## 2024-05-02 DIAGNOSIS — R97.20 ELEVATED PROSTATE SPECIFIC ANTIGEN [PSA]: ICD-10-CM

## 2024-05-11 LAB — PROSTATE BIOPSY: NORMAL

## 2024-08-05 NOTE — PATIENT PROFILE ADULT - INTERNATIONAL TRAVEL
GENERAL SURGERY  H and P  8/5/2024    HPI  Jeannette Salinas is a 63 y.o. female who presents to the general surgery service as a direct admission for colonoscopy. The patient has been admitted since 8/4/2024. She presented prior to her colonoscopy as she has issues transferring. She tolerated prep and thinks she has been going clear bowel movements.    This is a 63-year-old woman who is essentially bed bound at home from complications of previous multiple sclerosis and orthopedic injuries, who previously presented to the ED on 7/23 with a complaint of upper abdominal pain and a report of melenic stool and hematemesis. During this prior admission, she did not have any further melanic stools and did not have any further vomiting. There was no evidence of any ongoing bleeding/ Hb remained stable. She has had an EGD in the fairly recent past that showed gastritis, and she is scheduled for colonoscopy on this admission. She was seen by Dr. Uriostegui.     Her biggest remaining issue at the time of prior discharge was a complaint of chronic constipation. On Miralax and Colace. Shortly prior to this admission, pt was treated for UTI with ESBL E. Coli w/ Meropenem and one dose Fosfomycin. Pt has neurogenic bladder and chronic indwelling bojorquez catheter.      Past Medical History:   Diagnosis Date    Acquired hypothyroidism 09/14/2016    Acute blood loss as cause of postoperative anemia 11/19/2018    Anemia     Anxiety     Arthritis     Blood transfusion     Cataract, right eye     Chronic back pain     Depression     GERD (gastroesophageal reflux disease)     Hx of blood clots     Migraine     Multiple sclerosis (HCC) 2000    electric wheelchair. total  susan lift    Obesity     Osteoarthritis     Peripheral vascular disease (HCC)     vericose veins    Prominent abdominal aortic pulse 04/17/2014    Pulmonary embolism ([facility]) 05/2016    TIA (transient ischemic attack)     Urinary incontinence     has a indwelling catheter     No

## 2024-11-11 ENCOUNTER — APPOINTMENT (OUTPATIENT)
Dept: UROLOGY | Facility: CLINIC | Age: 59
End: 2024-11-11
Payer: COMMERCIAL

## 2024-11-11 ENCOUNTER — NON-APPOINTMENT (OUTPATIENT)
Age: 59
End: 2024-11-11

## 2024-11-11 VITALS — DIASTOLIC BLOOD PRESSURE: 73 MMHG | HEART RATE: 61 BPM | SYSTOLIC BLOOD PRESSURE: 123 MMHG

## 2024-11-11 DIAGNOSIS — R97.20 ELEVATED PROSTATE, SPECIFIC ANTIGEN [PSA]: ICD-10-CM

## 2024-11-11 DIAGNOSIS — R39.9 UNSPECIFIED SYMPTOMS AND SIGNS INVOLVING THE GENITOURINARY SYSTEM: ICD-10-CM

## 2024-11-11 PROCEDURE — 99214 OFFICE O/P EST MOD 30 MIN: CPT | Mod: 57

## 2024-11-18 LAB
BACTERIA UR CULT: NORMAL
PSA SERPL-MCNC: 8.25 NG/ML

## 2024-12-12 ENCOUNTER — OUTPATIENT (OUTPATIENT)
Dept: OUTPATIENT SERVICES | Facility: HOSPITAL | Age: 59
LOS: 1 days | End: 2024-12-12

## 2024-12-12 VITALS
WEIGHT: 151.9 LBS | OXYGEN SATURATION: 98 % | SYSTOLIC BLOOD PRESSURE: 154 MMHG | HEIGHT: 66 IN | RESPIRATION RATE: 15 BRPM | TEMPERATURE: 98 F | DIASTOLIC BLOOD PRESSURE: 85 MMHG | HEART RATE: 71 BPM

## 2024-12-12 DIAGNOSIS — N40.0 BENIGN PROSTATIC HYPERPLASIA WITHOUT LOWER URINARY TRACT SYMPTOMS: ICD-10-CM

## 2024-12-12 DIAGNOSIS — M65.332 TRIGGER FINGER, LEFT MIDDLE FINGER: Chronic | ICD-10-CM

## 2024-12-12 DIAGNOSIS — R39.9 UNSPECIFIED SYMPTOMS AND SIGNS INVOLVING THE GENITOURINARY SYSTEM: ICD-10-CM

## 2024-12-12 LAB
ANION GAP SERPL CALC-SCNC: 13 MMOL/L — SIGNIFICANT CHANGE UP (ref 7–14)
BUN SERPL-MCNC: 10 MG/DL — SIGNIFICANT CHANGE UP (ref 7–23)
CALCIUM SERPL-MCNC: 9.3 MG/DL — SIGNIFICANT CHANGE UP (ref 8.4–10.5)
CHLORIDE SERPL-SCNC: 102 MMOL/L — SIGNIFICANT CHANGE UP (ref 98–107)
CO2 SERPL-SCNC: 27 MMOL/L — SIGNIFICANT CHANGE UP (ref 22–31)
CREAT SERPL-MCNC: 0.67 MG/DL — SIGNIFICANT CHANGE UP (ref 0.5–1.3)
EGFR: 106 ML/MIN/1.73M2 — SIGNIFICANT CHANGE UP
GLUCOSE SERPL-MCNC: 93 MG/DL — SIGNIFICANT CHANGE UP (ref 70–99)
HCT VFR BLD CALC: 45.5 % — SIGNIFICANT CHANGE UP (ref 39–50)
HGB BLD-MCNC: 15.1 G/DL — SIGNIFICANT CHANGE UP (ref 13–17)
MCHC RBC-ENTMCNC: 30 PG — SIGNIFICANT CHANGE UP (ref 27–34)
MCHC RBC-ENTMCNC: 33.2 G/DL — SIGNIFICANT CHANGE UP (ref 32–36)
MCV RBC AUTO: 90.3 FL — SIGNIFICANT CHANGE UP (ref 80–100)
NRBC # BLD: 0 /100 WBCS — SIGNIFICANT CHANGE UP (ref 0–0)
NRBC # FLD: 0 K/UL — SIGNIFICANT CHANGE UP (ref 0–0)
PLATELET # BLD AUTO: 288 K/UL — SIGNIFICANT CHANGE UP (ref 150–400)
POTASSIUM SERPL-MCNC: 3.9 MMOL/L — SIGNIFICANT CHANGE UP (ref 3.5–5.3)
POTASSIUM SERPL-SCNC: 3.9 MMOL/L — SIGNIFICANT CHANGE UP (ref 3.5–5.3)
RBC # BLD: 5.04 M/UL — SIGNIFICANT CHANGE UP (ref 4.2–5.8)
RBC # FLD: 12.3 % — SIGNIFICANT CHANGE UP (ref 10.3–14.5)
SODIUM SERPL-SCNC: 142 MMOL/L — SIGNIFICANT CHANGE UP (ref 135–145)
WBC # BLD: 3.98 K/UL — SIGNIFICANT CHANGE UP (ref 3.8–10.5)
WBC # FLD AUTO: 3.98 K/UL — SIGNIFICANT CHANGE UP (ref 3.8–10.5)

## 2024-12-12 NOTE — H&P PST ADULT - HISTORY OF PRESENT ILLNESS
58 yo male with pmhx of BPH elevated PSA level c/o of  difficulty with urination and slow stream.  Prostate bx done 5/2024 which was negative. Pt complaints d when he wakes up, he has slow stream and incomplete emptying.  Pt was evaluated by surgeon 11/1/24 Urine culture done pt was experiencing  suprapubic tenderness and incomplete emptying (sensation). Culture positive Pt was treated with ABX.  Pt Now present in Presurgical testing for preop evaluation for scheduled procedure cystoscopy, transurethral resection of prostate, 58 yo male with pmhx of BPH elevated PSA level c/o of  difficulty with urination and slow stream.  Prostate bx done 5/2024 which was negative. Pt complaints when he wakes up he has slow stream and incomplete emptying.  Pt was evaluated by surgeon 11/1/24 Urine culture done pt was experiencing  suprapubic tenderness and incomplete emptying (sensation). Culture positive Pt was treated with ABX.  Pt Now present in Presurgical testing for preop evaluation for scheduled procedure cystoscopy, transurethral resection of prostate,

## 2024-12-12 NOTE — H&P PST ADULT - NEGATIVE ENMT SYMPTOMS
no hearing difficulty/no ear pain/no tinnitus/no vertigo/no sinus symptoms/no nasal congestion/no nasal obstruction/no nose bleeds/no gum bleeding

## 2024-12-12 NOTE — H&P PST ADULT - PROBLEM SELECTOR PLAN 1
Scheduled for Cystoscopy Transurethral resection of the prostate  Preop instructions provided and patient verbalizes understanding.  Hibiclens and Famotidine provided with instructions.  BMP, CBC, urine culture results  pending   Pt instructed to take medication as prescribed

## 2024-12-13 LAB
CULTURE RESULTS: NO GROWTH — SIGNIFICANT CHANGE UP
SPECIMEN SOURCE: SIGNIFICANT CHANGE UP

## 2024-12-18 NOTE — ASU PATIENT PROFILE, ADULT - LEARNING ASSESSMENT (PATIENT) ADDITIONAL COMMENTS
Contacted pt. Pt states he has not heard from MRI in regards to scheduling procedure. Advised I would contact radiology dept and request a call to pt to schedule MRI arthrogram. Pt verbalized understanding. Notified Kellie Benson in radiology that pt was awaiting a phone call to schedule.    none

## 2024-12-19 ENCOUNTER — OUTPATIENT (OUTPATIENT)
Dept: OUTPATIENT SERVICES | Facility: HOSPITAL | Age: 59
LOS: 1 days | Discharge: HOME | End: 2024-12-19
Payer: COMMERCIAL

## 2024-12-19 ENCOUNTER — RESULT REVIEW (OUTPATIENT)
Age: 59
End: 2024-12-19

## 2024-12-19 ENCOUNTER — TRANSCRIPTION ENCOUNTER (OUTPATIENT)
Age: 59
End: 2024-12-19

## 2024-12-19 ENCOUNTER — APPOINTMENT (OUTPATIENT)
Dept: UROLOGY | Facility: HOSPITAL | Age: 59
End: 2024-12-19

## 2024-12-19 VITALS
RESPIRATION RATE: 16 BRPM | DIASTOLIC BLOOD PRESSURE: 77 MMHG | HEART RATE: 77 BPM | TEMPERATURE: 98 F | OXYGEN SATURATION: 97 % | SYSTOLIC BLOOD PRESSURE: 132 MMHG

## 2024-12-19 VITALS
OXYGEN SATURATION: 100 % | RESPIRATION RATE: 18 BRPM | TEMPERATURE: 98 F | DIASTOLIC BLOOD PRESSURE: 77 MMHG | SYSTOLIC BLOOD PRESSURE: 148 MMHG | HEIGHT: 68 IN | HEART RATE: 61 BPM | WEIGHT: 154.98 LBS

## 2024-12-19 DIAGNOSIS — M65.332 TRIGGER FINGER, LEFT MIDDLE FINGER: Chronic | ICD-10-CM

## 2024-12-19 PROCEDURE — 52601 PROSTATECTOMY (TURP): CPT

## 2024-12-19 PROCEDURE — 88305 TISSUE EXAM BY PATHOLOGIST: CPT | Mod: 26

## 2024-12-19 RX ORDER — FENTANYL 12 UG/H
50 PATCH, EXTENDED RELEASE TRANSDERMAL
Refills: 0 | Status: DISCONTINUED | OUTPATIENT
Start: 2024-12-19 | End: 2024-12-19

## 2024-12-19 RX ORDER — OXYCODONE HYDROCHLORIDE 30 MG/1
5 TABLET ORAL ONCE
Refills: 0 | Status: DISCONTINUED | OUTPATIENT
Start: 2024-12-19 | End: 2024-12-19

## 2024-12-19 RX ORDER — SODIUM CHLORIDE 9 MG/ML
3 INJECTION, SOLUTION INTRAMUSCULAR; INTRAVENOUS; SUBCUTANEOUS EVERY 8 HOURS
Refills: 0 | Status: ACTIVE | OUTPATIENT
Start: 2024-12-19 | End: 2025-11-17

## 2024-12-19 RX ORDER — PHENAZOPYRIDINE HCL 200 MG
1 TABLET ORAL
Qty: 6 | Refills: 0
Start: 2024-12-19 | End: 2024-12-20

## 2024-12-19 RX ORDER — 0.9 % SODIUM CHLORIDE 0.9 %
1000 INTRAVENOUS SOLUTION INTRAVENOUS
Refills: 0 | Status: ACTIVE | OUTPATIENT
Start: 2024-12-19 | End: 2025-11-17

## 2024-12-19 RX ORDER — FENTANYL 12 UG/H
25 PATCH, EXTENDED RELEASE TRANSDERMAL
Refills: 0 | Status: DISCONTINUED | OUTPATIENT
Start: 2024-12-19 | End: 2024-12-19

## 2024-12-19 NOTE — ASU DISCHARGE PLAN (ADULT/PEDIATRIC) - ASU DC SPECIAL INSTRUCTIONSFT
GENERAL: It is common to have blood in your urine after your procedure. It may be pink or even red; inform your doctor if your kaur catheter stops draining. The kaur catheter will be removed in office in a few days.   BATHING: You may shower or bathe.  DIET: You may resume your regular diet and regular medication regimen.  PAIN: You may take Tylenol (acetaminophen) 650-975mg and/or Motrin (ibuprofen) 400-600mg, both available over the counter, for pain every 6 hours as needed. Do not exceed 4000mg of Tylenol (acetaminophen) daily. You may alternate these medications such that you take one or the other every 3 hours for around the clock pain coverage. Pyridium 200 mg, take 3 times a day for 2 days.   ANTIBIOTICS: You may be given a prescription for an antibiotic, please take this medication as instructed and be sure to complete the entire course. Begin taking the antibiotics 1 day before your catheter removal, then on the day of your catheter removal, and the last 2 doses on the day after catheter removal.   STOOL SOFTENERS: Do not allow yourself to become constipated as straining may cause bleeding. Take stool softeners or a laxative (ex. Miralax, Colace, Senokot, ExLax, etc), available over the counter, if needed.  ACTIVITY: No heavy lifting or strenuous exercise until you are evaluated at your post-operative appointment. Otherwise, you may return to your usual level of physical activity.    FOLLOW-UP: If you did not already schedule your post-operative appointment, please call your urologist to schedule and follow-up appointment. The office will call you to schedule kaur catheter removal in a few days.     CALL YOUR UROLOGIST IF: You have any bleeding that does not stop, inability to void >8 hours, fever over 100.4 F, chills, persistent nausea/vomiting, changes in your incision concerning for infection, or if your pain is not controlled on your discharge pain medications.

## 2024-12-19 NOTE — ASU PREOP CHECKLIST - SURGICAL CONSENT
Patient Information     Patient Name MRN Mya Clark [id number] Female 2001      Patient Instructions by Farhat Goodwin MD at 2017 10:00 AM     Author:  Farhat Goodwin MD Service:  (none) Author Type:  Physician     Filed:  2017 10:34 AM Encounter Date:  2017 Status:  Signed     :  Farhat Goodwin MD (Physician)            Take the antibiotics.  If you have worsening fever or feel more ill, come back to [facility] right away.  I will have final urine results on  but it does look like you have a urinary tract infection.  We can do a 2hr urine if symptoms don't resolve promptly as well as consider a change in antibiotic.         done

## 2024-12-19 NOTE — ASU DISCHARGE PLAN (ADULT/PEDIATRIC) - CARE PROVIDER_API CALL
Zach Yao  Urology  [address] [place] [id number]  Phone: [phone]  Fax: [phone]  Follow Up Time:

## 2024-12-19 NOTE — ASU DISCHARGE PLAN (ADULT/PEDIATRIC) - FINANCIAL ASSISTANCE
[facility] provides services at a reduced cost to those who are determined to be eligible through [facility]’s financial assistance program. Information regarding [facility]’s financial assistance program can be found by going to [email or web address] or by calling [phone].

## 2024-12-19 NOTE — ASU DISCHARGE PLAN (ADULT/PEDIATRIC) - FOLLOW UP APPOINTMENTS
828 may also call Recovery Room (PACU) 24/7 @ [phone]/[facility]

## 2024-12-19 NOTE — BRIEF OPERATIVE NOTE - OPERATION/FINDINGS
uncomplicated transurethral resection of prostate uncomplicated transurethral resection of prostate  Dictation: [id number]

## 2024-12-23 ENCOUNTER — APPOINTMENT (OUTPATIENT)
Dept: UROLOGY | Facility: CLINIC | Age: 59
End: 2024-12-23

## 2024-12-23 ENCOUNTER — TRANSCRIPTION ENCOUNTER (OUTPATIENT)
Age: 59
End: 2024-12-23

## 2024-12-23 VITALS — DIASTOLIC BLOOD PRESSURE: 88 MMHG | SYSTOLIC BLOOD PRESSURE: 164 MMHG

## 2024-12-23 VITALS — SYSTOLIC BLOOD PRESSURE: 130 MMHG | DIASTOLIC BLOOD PRESSURE: 56 MMHG

## 2024-12-23 DIAGNOSIS — N40.1 BENIGN PROSTATIC HYPERPLASIA WITH LOWER URINARY TRACT SYMPMS: ICD-10-CM

## 2024-12-23 PROCEDURE — 99024 POSTOP FOLLOW-UP VISIT: CPT

## 2024-12-23 PROCEDURE — 51700 IRRIGATION OF BLADDER: CPT

## 2024-12-27 LAB — SURGICAL PATHOLOGY STUDY: SIGNIFICANT CHANGE UP

## 2025-01-23 ENCOUNTER — APPOINTMENT (OUTPATIENT)
Dept: UROLOGY | Facility: CLINIC | Age: 60
End: 2025-01-23
Payer: COMMERCIAL

## 2025-01-23 VITALS — SYSTOLIC BLOOD PRESSURE: 147 MMHG | DIASTOLIC BLOOD PRESSURE: 73 MMHG

## 2025-01-23 DIAGNOSIS — N40.1 BENIGN PROSTATIC HYPERPLASIA WITH LOWER URINARY TRACT SYMPMS: ICD-10-CM

## 2025-01-23 DIAGNOSIS — R97.20 ELEVATED PROSTATE, SPECIFIC ANTIGEN [PSA]: ICD-10-CM

## 2025-01-23 PROCEDURE — 99213 OFFICE O/P EST LOW 20 MIN: CPT | Mod: 24

## 2025-01-25 ENCOUNTER — TRANSCRIPTION ENCOUNTER (OUTPATIENT)
Age: 60
End: 2025-01-25

## 2025-01-25 LAB
BACTERIA UR CULT: NORMAL
PSA SERPL-MCNC: 2.3 NG/ML

## 2025-03-03 NOTE — ED ADULT NURSE NOTE - NSFALLRSKOUTCOME_ED_ALL_ED
CHIEF COMPLAINT(S):   Chief Complaint   Patient presents with    Right Knee - Follow-up       HISTORY OF PRESENT ILLNESS:  Ziyad Quezada is a 51 year old male last seen on 01/10/25 who presents today for follow up of right knee osteoarthritis and medial meniscus tear. He was advised to start physical therapy, perform home exercise program, ice, and take ibuprofen and tylenol as needed. He attended 6 visits of physical therapy and was discharged. Overall patient feels worse compared to last visit. He reports initial relief with PT but now overall feels no significant relief with the therapy. He is performing home exercise program daily. He denies icing. He denies taking pain medications. He localizes his pain to the medial aspect of the knee, changed from initial visit at the anterior knee.  Has also noted new clicking of the knee.    Accompanied by self  Location: right knee  Date of Onset: 10/29/24  Context: see above  Did this injury occur at work: Yes  Severity:8-9/10   Timing: intermittent  Quality: throbbing  Associated signs and symptoms: none  Aggravating factors: getting out of car  Alleviating factors: nothing  Patient feels: 60-70/100  Occupation: [employer]     Review of Systems   Constitutional:  Negative for chills and fever.   HENT:  Negative for ear pain, hearing loss, sinus pain and sore throat.    Eyes:  Negative for pain.   Respiratory:  Negative for cough, shortness of breath and wheezing.    Cardiovascular:  Negative for chest pain.   Gastrointestinal:  Negative for abdominal pain, constipation and diarrhea.   Genitourinary:  Negative for difficulty urinating and dysuria.   Musculoskeletal:  Positive for arthralgias. Negative for back pain, myalgias and neck pain.   Skin:  Negative for rash and wound.   Neurological:  Negative for seizures and syncope.   Psychiatric/Behavioral:  Negative for sleep disturbance. The patient is not nervous/anxious.        The patient was instructed to follow up  with PCP regarding all positive ROS that were not directly pertinent with the chief complaint.    Past Medical History Updated: Yes  PAST MEDICAL HISTORY:  Past Medical History:   Diagnosis Date    Thyroid condition        Past Surgical History Updated: Yes  PAST SURGICAL HISTORY:  Past Surgical History:   Procedure Laterality Date    Shoulder surg proc unlisted Right 2021    Thyroid lobectomy,unilat  2017       Past Family History Updated: Yes  FAMILY HISTORY:  Family History   Problem Relation Age of Onset    Patient is unaware of any medical problems Mother     Diabetes Father     Patient is unaware of any medical problems Maternal Grandmother     Patient is unaware of any medical problems Maternal Grandfather     Patient is unaware of any medical problems Paternal Grandmother     Patient is unaware of any medical problems Paternal Grandfather     Patient is unaware of any medical problems Sister     Patient is unaware of any medical problems Sister     Patient is unaware of any medical problems Sister     Patient is unaware of any medical problems Brother      Reviewed and non-contributory to patient's illness unless otherwise stated above    Past Social History Updated: Yes  SOCIAL HISTORY:  Social History     Socioeconomic History    Marital status: /Civil Union     Spouse name: Not on file    Number of children: Not on file    Years of education: Not on file    Highest education level: Not on file   Occupational History    Not on file   Tobacco Use    Smoking status: Never    Smokeless tobacco: Never   Vaping Use    Vaping status: never used   Substance and Sexual Activity    Alcohol use: Yes     Alcohol/week: 6.0 standard drinks of alcohol     Types: 6 Shots of liquor per week     Comment: weekends    Drug use: Never    Sexual activity: Yes   Other Topics Concern    Not on file   Social History Narrative    Not on file     Social Determinants of Health     Financial Resource Strain: Not on file   Food  Insecurity: Low Risk  (12/31/2024)    Food Insecurity     Worried about Food: Never true     Food is Gone: Never true   Transportation Needs: Not At Risk (12/31/2024)    Transportation Needs     Lack of Reliable Transportation: No   Physical Activity: Not on file   Stress: Not on file   Social Connections: Not on file   Interpersonal Safety: Low Risk  (3/7/2024)    Interpersonal Safety     How often physically hurt: Never     How often insulted or talked down to: Never     How often threatened with harm: Never     How often scream or curse at: Never        MEDICATIONS:  Current Outpatient Medications   Medication Sig Dispense Refill    terbinafine (LamISIL) 250 MG tablet Take 250 mg by mouth daily. Appointment required for future refills.       No current facility-administered medications for this visit.     ALLERGIES:   ALLERGIES:  No Known Allergies    VITAL SIGNS:  Visit Vitals  /80   Ht 5' 7.5\" (1.715 m)   Wt 108.9 kg (240 lb)   BMI 37.03 kg/m²     Body mass index is 37.03 kg/m².    Roomed by: CHETAN Godoy    EXAM:  This is a 51 year old male, awake, alert, and cooperative. He is well nourished, well developed, and in no apparent distress.  Pulmonary - No increased work of breathing.  Lymphatics - There is no evidence of generalized adenopathy.  MSK:  R knee-  Trace effusion  ROM 0-125  +TTP medial joint line  Pain with mcmurrays test  5/5 quad/HS strength    IMAGING & TESTS:   No new images to review    Internal physical therapy progress notes were reviewed and analyzed.  These show: 6 PT visits at [facility] [place].  He has made gains as expected in terms of increase strength however pain persist with pivoting motions.  Discharged from PT due to meeting only some goals    ASSESSMENT & PLAN:  Ziyad Quezada is a 51 year old male with right knee pain due to medial meniscus tear and underlying mild OA    He was last seen on 1/10/2025, at that time he was having more anterior knee pain consistent with  his mild patellofemoral OA.  He was sent to PT, initially was having some improvement but then worsened.  Has also endorsed new clicking and pain is now more towards the medial aspect of the knee    I discussed with patient that his pain is now more related to his medial meniscus tear.  At this time given the new clicking I have a change of his pain, will refer to Dr. Nichols to discuss surgical options.  Recommend he continue to ice daily.  Can take OTC meds as needed for pain.    Plan as follows:  1.  Referral to Dr. Nichols  2.  Ice daily as needed  3.  Continue Home exercise program as tolerated  4.  May take Tylenol or ibuprofen if needed for pain    The patient will follow up with us Dr. Nichols    Restrictions: Okay to continue working, limit squatting, kneeling, twisting motions of the knee      Other tear of medial meniscus of right knee as current injury, initial encounter  (primary encounter diagnosis)    Primary osteoarthritis of right knee    Chronic pain of right knee      On 3/7/2025, ICrow LAT was present during the encounter and assisted in the documentation of the services performed by Kwesi Becerril DO    Patient was seen with ancillary staff present. (This includes nurses, medical students, medical fellows, certified interpreters, athletic trainers and/or medical assistants).  Ancillary staff notes were reviewed and accepted. I personally obtained history, examined the patient, reviewed relevant imaging, and determined the assessment and plan.  I personally performed the medical decision making and discussed this with the patient/family. All clinical staff and/or scribe documentation is noted and accepted as an accurate representation of the clinical visit and plan of care.     Electronically signed by: Kwesi Becerril DO  3/7/2025       Total time was 30 minutes. This includes chart review before the visit, actual time spent with patient, and time spent on documentation after the visit.   Universal Safety Interventions

## 2025-04-07 ENCOUNTER — APPOINTMENT (OUTPATIENT)
Dept: UROLOGY | Facility: CLINIC | Age: 60
End: 2025-04-07
Payer: COMMERCIAL

## 2025-04-07 DIAGNOSIS — N40.1 BENIGN PROSTATIC HYPERPLASIA WITH LOWER URINARY TRACT SYMPMS: ICD-10-CM

## 2025-04-07 DIAGNOSIS — R39.9 UNSPECIFIED SYMPTOMS AND SIGNS INVOLVING THE GENITOURINARY SYSTEM: ICD-10-CM

## 2025-04-07 LAB
APPEARANCE: CLEAR
BACTERIA: NEGATIVE /HPF
BILIRUBIN URINE: NEGATIVE
BLOOD URINE: NEGATIVE
CAST: 0 /LPF
COLOR: YELLOW
EPITHELIAL CELLS: 0 /HPF
GLUCOSE QUALITATIVE U: NEGATIVE MG/DL
KETONES URINE: NEGATIVE MG/DL
LEUKOCYTE ESTERASE URINE: NEGATIVE
MICROSCOPIC-UA: NORMAL
NITRITE URINE: NEGATIVE
PH URINE: 8
PROTEIN URINE: NEGATIVE MG/DL
RED BLOOD CELLS URINE: 0 /HPF
SPECIFIC GRAVITY URINE: 1.02
UROBILINOGEN URINE: 0.2 MG/DL
WHITE BLOOD CELLS URINE: 0 /HPF

## 2025-04-07 PROCEDURE — 99213 OFFICE O/P EST LOW 20 MIN: CPT

## 2025-04-21 NOTE — PATIENT PROFILE ADULT - DATE OF FIRST COVID-19 BOOSTER
H&P reviewed. After examining the patient I find no changes in the patients condition since the H&P had been written.    Vitals:    04/21/25 0800   BP: 115/70   Pulse: 91   Resp: 18   Temp: 99 °F (37.2 °C)   SpO2: 98%     
04-Jan-2022

## (undated) DEVICE — TUBING THERMADX UROLOGY

## (undated) DEVICE — SYR TOOMEY 50ML

## (undated) DEVICE — GLV 7.5 PROTEXIS (WHITE)

## (undated) DEVICE — WARMING BLANKET UPPER ADULT

## (undated) DEVICE — CANISTER DISPOSABLE THIN WALL 3000CC

## (undated) DEVICE — ELCTR GROUNDING PAD ADULT COVIDIEN

## (undated) DEVICE — ELCTR PLASMA LOOP MEDIUM ANGLED 24FR 12-30 DEG

## (undated) DEVICE — POSITIONER STRAP ARMBOARD VELCRO TS-30

## (undated) DEVICE — BAR ROLLER FOR ELITE ELCTR

## (undated) DEVICE — PACK CYSTO

## (undated) DEVICE — VENODYNE/SCD SLEEVE CALF MEDIUM

## (undated) DEVICE — TUBING LEVEL ONE NORMOFLO SET

## (undated) DEVICE — BAG URINE W METER 2L

## (undated) DEVICE — SOL IRR POUR H2O 1500ML

## (undated) DEVICE — CABLE DAC ACTIVE CORD

## (undated) DEVICE — FOLEY CATH 3-WAY 22FR 30CC LATEX LUBRICATH

## (undated) DEVICE — SOL IRR BAG H2O 3000ML

## (undated) DEVICE — GLV 7.5 PROTEXIS (CREAM) MICRO